# Patient Record
Sex: FEMALE | Race: WHITE | NOT HISPANIC OR LATINO
[De-identification: names, ages, dates, MRNs, and addresses within clinical notes are randomized per-mention and may not be internally consistent; named-entity substitution may affect disease eponyms.]

---

## 2017-02-06 ENCOUNTER — APPOINTMENT (OUTPATIENT)
Dept: MRI IMAGING | Facility: CLINIC | Age: 62
End: 2017-02-06

## 2017-02-06 ENCOUNTER — OUTPATIENT (OUTPATIENT)
Dept: OUTPATIENT SERVICES | Facility: HOSPITAL | Age: 62
LOS: 1 days | End: 2017-02-06
Payer: COMMERCIAL

## 2017-02-06 DIAGNOSIS — Z00.8 ENCOUNTER FOR OTHER GENERAL EXAMINATION: ICD-10-CM

## 2017-02-06 PROCEDURE — 73721 MRI JNT OF LWR EXTRE W/O DYE: CPT

## 2017-02-10 ENCOUNTER — OUTPATIENT (OUTPATIENT)
Dept: OUTPATIENT SERVICES | Facility: HOSPITAL | Age: 62
LOS: 1 days | Discharge: HOME | End: 2017-02-10
Payer: COMMERCIAL

## 2017-02-10 VITALS
HEIGHT: 64 IN | TEMPERATURE: 99 F | OXYGEN SATURATION: 100 % | DIASTOLIC BLOOD PRESSURE: 93 MMHG | SYSTOLIC BLOOD PRESSURE: 161 MMHG | WEIGHT: 168.43 LBS | RESPIRATION RATE: 18 BRPM | HEART RATE: 91 BPM

## 2017-02-10 DIAGNOSIS — M17.12 UNILATERAL PRIMARY OSTEOARTHRITIS, LEFT KNEE: ICD-10-CM

## 2017-02-10 DIAGNOSIS — S83.242A OTHER TEAR OF MEDIAL MENISCUS, CURRENT INJURY, LEFT KNEE, INITIAL ENCOUNTER: ICD-10-CM

## 2017-02-10 DIAGNOSIS — Z98.890 OTHER SPECIFIED POSTPROCEDURAL STATES: Chronic | ICD-10-CM

## 2017-02-10 LAB
ANION GAP SERPL CALC-SCNC: 11 MMOL/L — SIGNIFICANT CHANGE UP (ref 5–17)
APTT BLD: 32.3 SEC — SIGNIFICANT CHANGE UP (ref 27.5–37.4)
BASOPHILS # BLD AUTO: 0.1 K/UL — SIGNIFICANT CHANGE UP (ref 0–0.2)
BASOPHILS NFR BLD AUTO: 1 % — SIGNIFICANT CHANGE UP (ref 0–2)
BUN SERPL-MCNC: 18 MG/DL — SIGNIFICANT CHANGE UP (ref 7–23)
CALCIUM SERPL-MCNC: 8.6 MG/DL — SIGNIFICANT CHANGE UP (ref 8.5–10.1)
CHLORIDE SERPL-SCNC: 106 MMOL/L — SIGNIFICANT CHANGE UP (ref 96–108)
CO2 SERPL-SCNC: 27 MMOL/L — SIGNIFICANT CHANGE UP (ref 22–31)
CREAT SERPL-MCNC: 0.87 MG/DL — SIGNIFICANT CHANGE UP (ref 0.5–1.3)
EOSINOPHIL # BLD AUTO: 0.3 K/UL — SIGNIFICANT CHANGE UP (ref 0–0.5)
EOSINOPHIL NFR BLD AUTO: 5.3 % — SIGNIFICANT CHANGE UP (ref 0–6)
GLUCOSE SERPL-MCNC: 129 MG/DL — HIGH (ref 70–99)
HCT VFR BLD CALC: 41 % — SIGNIFICANT CHANGE UP (ref 34.5–45)
HGB BLD-MCNC: 13.7 G/DL — SIGNIFICANT CHANGE UP (ref 11.5–15.5)
INR BLD: 1.27 RATIO — HIGH (ref 0.88–1.16)
LYMPHOCYTES # BLD AUTO: 2.3 K/UL — SIGNIFICANT CHANGE UP (ref 1–3.3)
LYMPHOCYTES # BLD AUTO: 37.4 % — SIGNIFICANT CHANGE UP (ref 13–44)
MCHC RBC-ENTMCNC: 31.7 PG — SIGNIFICANT CHANGE UP (ref 27–34)
MCHC RBC-ENTMCNC: 33.4 GM/DL — SIGNIFICANT CHANGE UP (ref 32–36)
MCV RBC AUTO: 95 FL — SIGNIFICANT CHANGE UP (ref 80–100)
MONOCYTES # BLD AUTO: 0.4 K/UL — SIGNIFICANT CHANGE UP (ref 0–0.9)
MONOCYTES NFR BLD AUTO: 7.4 % — SIGNIFICANT CHANGE UP (ref 2–14)
NEUTROPHILS # BLD AUTO: 2.9 K/UL — SIGNIFICANT CHANGE UP (ref 1.8–7.4)
NEUTROPHILS NFR BLD AUTO: 48.9 % — SIGNIFICANT CHANGE UP (ref 43–77)
PLATELET # BLD AUTO: 278 K/UL — SIGNIFICANT CHANGE UP (ref 150–400)
POTASSIUM SERPL-MCNC: 4.4 MMOL/L — SIGNIFICANT CHANGE UP (ref 3.5–5.3)
POTASSIUM SERPL-SCNC: 4.4 MMOL/L — SIGNIFICANT CHANGE UP (ref 3.5–5.3)
PROTHROM AB SERPL-ACNC: 14 SEC — HIGH (ref 10–13.1)
RBC # BLD: 4.31 M/UL — SIGNIFICANT CHANGE UP (ref 3.8–5.2)
RBC # FLD: 11.9 % — SIGNIFICANT CHANGE UP (ref 10.3–14.5)
SODIUM SERPL-SCNC: 144 MMOL/L — SIGNIFICANT CHANGE UP (ref 135–145)
WBC # BLD: 6 K/UL — SIGNIFICANT CHANGE UP (ref 3.8–10.5)
WBC # FLD AUTO: 6 K/UL — SIGNIFICANT CHANGE UP (ref 3.8–10.5)

## 2017-02-10 PROCEDURE — 93010 ELECTROCARDIOGRAM REPORT: CPT

## 2017-02-10 NOTE — H&P PST ADULT - PSH
delivery delivered   &   S/P LASIK surgery of both eyes  2013  Status post lateral meniscus repair  2012  Status post tendon repair  Left Foot as a teenager

## 2017-02-10 NOTE — H&P PST ADULT - ASSESSMENT
This is a 60 y/o female with a left torn meniscus who is scheduled for a repair with Dr. Alvarez.    Plan:    1. NPO post midnight of surgery  2. Instructed patient on the use of EZ sponges  3. Patient instructed to take Losartan on day of surgery with a sip of water  4. Patient aware that they need medical clearance prior to surgery

## 2017-02-10 NOTE — H&P PST ADULT - HISTORY OF PRESENT ILLNESS
This is a 60 y/o female with a PMH of HTN and HLD who reports she has left knee pain for the last 3 weeks. She denies any injury to her left knee. She had a MRI which shows a left torn meniscus and she is now scheduled for a left meniscus  repair with Dr. Alvarez.

## 2017-02-13 ENCOUNTER — RESULT REVIEW (OUTPATIENT)
Age: 62
End: 2017-02-13

## 2017-02-14 ENCOUNTER — OUTPATIENT (OUTPATIENT)
Dept: OUTPATIENT SERVICES | Facility: HOSPITAL | Age: 62
LOS: 1 days | Discharge: HOME | End: 2017-02-14
Payer: COMMERCIAL

## 2017-02-14 VITALS
HEART RATE: 99 BPM | TEMPERATURE: 98 F | SYSTOLIC BLOOD PRESSURE: 164 MMHG | RESPIRATION RATE: 16 BRPM | DIASTOLIC BLOOD PRESSURE: 78 MMHG | OXYGEN SATURATION: 987 %

## 2017-02-14 VITALS
DIASTOLIC BLOOD PRESSURE: 84 MMHG | OXYGEN SATURATION: 99 % | RESPIRATION RATE: 16 BRPM | WEIGHT: 168.43 LBS | HEIGHT: 64 IN | HEART RATE: 92 BPM | SYSTOLIC BLOOD PRESSURE: 145 MMHG | TEMPERATURE: 100 F

## 2017-02-14 DIAGNOSIS — Z98.890 OTHER SPECIFIED POSTPROCEDURAL STATES: Chronic | ICD-10-CM

## 2017-02-14 PROCEDURE — 88304 TISSUE EXAM BY PATHOLOGIST: CPT | Mod: 26

## 2017-02-14 RX ORDER — OXYCODONE HYDROCHLORIDE 5 MG/1
5 TABLET ORAL EVERY 4 HOURS
Qty: 0 | Refills: 0 | Status: DISCONTINUED | OUTPATIENT
Start: 2017-02-14 | End: 2017-02-14

## 2017-02-14 RX ORDER — FENTANYL CITRATE 50 UG/ML
50 INJECTION INTRAVENOUS
Qty: 0 | Refills: 0 | Status: DISCONTINUED | OUTPATIENT
Start: 2017-02-14 | End: 2017-02-14

## 2017-02-14 RX ORDER — SODIUM CHLORIDE 9 MG/ML
1000 INJECTION, SOLUTION INTRAVENOUS
Qty: 0 | Refills: 0 | Status: DISCONTINUED | OUTPATIENT
Start: 2017-02-14 | End: 2017-03-01

## 2017-02-14 RX ORDER — ONDANSETRON 8 MG/1
4 TABLET, FILM COATED ORAL
Qty: 0 | Refills: 0 | Status: DISCONTINUED | OUTPATIENT
Start: 2017-02-14 | End: 2017-03-01

## 2017-02-14 RX ADMIN — FENTANYL CITRATE 50 MICROGRAM(S): 50 INJECTION INTRAVENOUS at 14:40

## 2017-02-14 RX ADMIN — OXYCODONE HYDROCHLORIDE 5 MILLIGRAM(S): 5 TABLET ORAL at 14:40

## 2017-02-14 RX ADMIN — SODIUM CHLORIDE 75 MILLILITER(S): 9 INJECTION, SOLUTION INTRAVENOUS at 14:43

## 2017-02-14 RX ADMIN — FENTANYL CITRATE 50 MICROGRAM(S): 50 INJECTION INTRAVENOUS at 14:54

## 2017-02-14 NOTE — ASU DISCHARGE PLAN (ADULT/PEDIATRIC). - ACTIVITY LEVEL
weight bearing as tolerated/elevate extremity/no exercise/no sports/gym/no heavy lifting/weight bearing as tolerated Left lower extremity with assistive devices/no tub baths/quiet play

## 2017-02-14 NOTE — BRIEF OPERATIVE NOTE - PROCEDURE
Knee arthroscopy, left  02/14/2017  Left Knee Arthroscopy, Partial Medial Menisectomy, Synovectomy, Chondroplasty  Active  [id number]

## 2017-02-14 NOTE — ASU DISCHARGE PLAN (ADULT/PEDIATRIC). - NURSING INSTRUCTIONS
apply ice to affected area for 24 to 48 hours, 20 minutes on then 20 minutes off. Do not apply directly to skin, place barrier between ice and skin.   For any problems or concerns,contact your doctor. [facility] patients should call the [facility]. If you cannot reach the doctor or clinic, call [facility] Emergency Department at [phone] or go to your local Emergency Department.  A responsible adult should be with you for the rest of the day and night for your safety and to help you if you needed. Resume your medications as listed on the attached Medication Record.

## 2017-02-14 NOTE — ASU DISCHARGE PLAN (ADULT/PEDIATRIC). - NOTIFY
Numbness, tingling/Swelling that continues/Pain not relieved by Medications/Numbness, color, or temperature change to extremity/Bleeding that does not stop/Fever greater than 101

## 2017-02-14 NOTE — ASU DISCHARGE PLAN (ADULT/PEDIATRIC). - MEDICATION SUMMARY - MEDICATIONS TO TAKE
I will START or STAY ON the medications listed below when I get home from the hospital:    Aspir 81 oral delayed release tablet  -- 1 tab(s) by mouth once a day  -- Indication: For per PMD    Percocet 5/325 oral tablet  -- 1 tab(s) by mouth every 4 hours, As Needed for severe pain  -- Indication: For Severe pain    losartan 100 mg oral tablet  -- 1 tab(s) by mouth once a day  -- Indication: For per PMD    Lipitor 40 mg oral tablet  -- 1 tab(s) by mouth once a day  -- Indication: For per PMD    niacin 50 mg oral tablet  -- 1 tab(s) by mouth once a day  -- Indication: For per PMD    Probiotic Formula (Bacillus Coagulans) oral capsule  -- 1 cap(s) by mouth once a day  -- Indication: For per PMD    Vitamin B Compound Strong  -- 1 cap(s) by mouth once a day  -- Indication: For per PMD    Indigo-C 1000 mg oral tablet  -- 1 tab(s) by mouth once a day  -- Indication: For per PMD    Vitamin D3 1000 intl units oral capsule  -- 4 cap(s) by mouth   -- This equals 4,000 IU a day  -- Indication: For per PMD

## 2017-02-14 NOTE — ASU DISCHARGE PLAN (ADULT/PEDIATRIC). - INSTRUCTIONS
Resume normal diet. Avoid alcohol when taking pain medication FU in 10-14 days. Please call for appt.

## 2017-02-16 DIAGNOSIS — M23.322 OTHER MENISCUS DERANGEMENTS, POSTERIOR HORN OF MEDIAL MENISCUS, LEFT KNEE: ICD-10-CM

## 2017-02-16 DIAGNOSIS — I10 ESSENTIAL (PRIMARY) HYPERTENSION: ICD-10-CM

## 2017-02-16 DIAGNOSIS — K57.30 DIVERTICULOSIS OF LARGE INTESTINE WITHOUT PERFORATION OR ABSCESS WITHOUT BLEEDING: ICD-10-CM

## 2017-02-16 DIAGNOSIS — E78.5 HYPERLIPIDEMIA, UNSPECIFIED: ICD-10-CM

## 2017-02-16 DIAGNOSIS — M17.12 UNILATERAL PRIMARY OSTEOARTHRITIS, LEFT KNEE: ICD-10-CM

## 2017-02-16 DIAGNOSIS — M65.862 OTHER SYNOVITIS AND TENOSYNOVITIS, LEFT LOWER LEG: ICD-10-CM

## 2017-02-16 DIAGNOSIS — Z87.442 PERSONAL HISTORY OF URINARY CALCULI: ICD-10-CM

## 2017-02-16 LAB — SURGICAL PATHOLOGY FINAL REPORT - CH: SIGNIFICANT CHANGE UP

## 2020-06-22 ENCOUNTER — EMERGENCY (EMERGENCY)
Facility: HOSPITAL | Age: 65
LOS: 0 days | Discharge: LEFT BEFORE DISCHARGE | End: 2020-06-22
Attending: EMERGENCY MEDICINE
Payer: COMMERCIAL

## 2020-06-22 VITALS
OXYGEN SATURATION: 97 % | HEIGHT: 65 IN | HEART RATE: 76 BPM | RESPIRATION RATE: 17 BRPM | WEIGHT: 164.91 LBS | SYSTOLIC BLOOD PRESSURE: 135 MMHG | DIASTOLIC BLOOD PRESSURE: 61 MMHG | TEMPERATURE: 98 F

## 2020-06-22 DIAGNOSIS — I10 ESSENTIAL (PRIMARY) HYPERTENSION: ICD-10-CM

## 2020-06-22 DIAGNOSIS — E78.5 HYPERLIPIDEMIA, UNSPECIFIED: ICD-10-CM

## 2020-06-22 DIAGNOSIS — Z79.82 LONG TERM (CURRENT) USE OF ASPIRIN: ICD-10-CM

## 2020-06-22 DIAGNOSIS — F32.9 MAJOR DEPRESSIVE DISORDER, SINGLE EPISODE, UNSPECIFIED: ICD-10-CM

## 2020-06-22 DIAGNOSIS — Z98.890 OTHER SPECIFIED POSTPROCEDURAL STATES: Chronic | ICD-10-CM

## 2020-06-22 DIAGNOSIS — I49.8 OTHER SPECIFIED CARDIAC ARRHYTHMIAS: ICD-10-CM

## 2020-06-22 DIAGNOSIS — Z79.899 OTHER LONG TERM (CURRENT) DRUG THERAPY: ICD-10-CM

## 2020-06-22 LAB
ALBUMIN SERPL ELPH-MCNC: 4.6 G/DL — SIGNIFICANT CHANGE UP (ref 3.3–5)
ALP SERPL-CCNC: 53 U/L — SIGNIFICANT CHANGE UP (ref 40–120)
ALT FLD-CCNC: 35 U/L — SIGNIFICANT CHANGE UP (ref 12–78)
ANION GAP SERPL CALC-SCNC: 5 MMOL/L — SIGNIFICANT CHANGE UP (ref 5–17)
APAP SERPL-MCNC: < 2 UG/ML (ref 10–30)
APPEARANCE UR: CLEAR — SIGNIFICANT CHANGE UP
AST SERPL-CCNC: 9 U/L — LOW (ref 15–37)
BASOPHILS # BLD AUTO: 0.03 K/UL — SIGNIFICANT CHANGE UP (ref 0–0.2)
BASOPHILS NFR BLD AUTO: 0.4 % — SIGNIFICANT CHANGE UP (ref 0–2)
BILIRUB SERPL-MCNC: 0.5 MG/DL — SIGNIFICANT CHANGE UP (ref 0.2–1.2)
BILIRUB UR-MCNC: NEGATIVE — SIGNIFICANT CHANGE UP
BUN SERPL-MCNC: 11 MG/DL — SIGNIFICANT CHANGE UP (ref 7–23)
CALCIUM SERPL-MCNC: 9.7 MG/DL — SIGNIFICANT CHANGE UP (ref 8.5–10.1)
CHLORIDE SERPL-SCNC: 105 MMOL/L — SIGNIFICANT CHANGE UP (ref 96–108)
CO2 SERPL-SCNC: 28 MMOL/L — SIGNIFICANT CHANGE UP (ref 22–31)
COLOR SPEC: YELLOW — SIGNIFICANT CHANGE UP
CREAT SERPL-MCNC: 2.13 MG/DL — HIGH (ref 0.5–1.3)
DIFF PNL FLD: NEGATIVE — SIGNIFICANT CHANGE UP
EOSINOPHIL # BLD AUTO: 0.19 K/UL — SIGNIFICANT CHANGE UP (ref 0–0.5)
EOSINOPHIL NFR BLD AUTO: 2.7 % — SIGNIFICANT CHANGE UP (ref 0–6)
ETHANOL SERPL-MCNC: <10 MG/DL — SIGNIFICANT CHANGE UP (ref 0–10)
GLUCOSE SERPL-MCNC: 101 MG/DL — HIGH (ref 70–99)
GLUCOSE UR QL: NEGATIVE MG/DL — SIGNIFICANT CHANGE UP
HCT VFR BLD CALC: 44 % — SIGNIFICANT CHANGE UP (ref 34.5–45)
HGB BLD-MCNC: 15.1 G/DL — SIGNIFICANT CHANGE UP (ref 11.5–15.5)
IMM GRANULOCYTES NFR BLD AUTO: 0.1 % — SIGNIFICANT CHANGE UP (ref 0–1.5)
KETONES UR-MCNC: ABNORMAL
LEUKOCYTE ESTERASE UR-ACNC: ABNORMAL
LYMPHOCYTES # BLD AUTO: 3.1 K/UL — SIGNIFICANT CHANGE UP (ref 1–3.3)
LYMPHOCYTES # BLD AUTO: 43.9 % — SIGNIFICANT CHANGE UP (ref 13–44)
MCHC RBC-ENTMCNC: 31.3 PG — SIGNIFICANT CHANGE UP (ref 27–34)
MCHC RBC-ENTMCNC: 34.3 GM/DL — SIGNIFICANT CHANGE UP (ref 32–36)
MCV RBC AUTO: 91.1 FL — SIGNIFICANT CHANGE UP (ref 80–100)
MONOCYTES # BLD AUTO: 0.42 K/UL — SIGNIFICANT CHANGE UP (ref 0–0.9)
MONOCYTES NFR BLD AUTO: 5.9 % — SIGNIFICANT CHANGE UP (ref 2–14)
NEUTROPHILS # BLD AUTO: 3.31 K/UL — SIGNIFICANT CHANGE UP (ref 1.8–7.4)
NEUTROPHILS NFR BLD AUTO: 47 % — SIGNIFICANT CHANGE UP (ref 43–77)
NITRITE UR-MCNC: NEGATIVE — SIGNIFICANT CHANGE UP
PCP SPEC-MCNC: SIGNIFICANT CHANGE UP
PH UR: 7 — SIGNIFICANT CHANGE UP (ref 5–8)
PLATELET # BLD AUTO: 232 K/UL — SIGNIFICANT CHANGE UP (ref 150–400)
POTASSIUM SERPL-MCNC: 3.6 MMOL/L — SIGNIFICANT CHANGE UP (ref 3.5–5.3)
POTASSIUM SERPL-SCNC: 3.6 MMOL/L — SIGNIFICANT CHANGE UP (ref 3.5–5.3)
PROT SERPL-MCNC: 8.3 GM/DL — SIGNIFICANT CHANGE UP (ref 6–8.3)
PROT UR-MCNC: NEGATIVE MG/DL — SIGNIFICANT CHANGE UP
RBC # BLD: 4.83 M/UL — SIGNIFICANT CHANGE UP (ref 3.8–5.2)
RBC # FLD: 12.5 % — SIGNIFICANT CHANGE UP (ref 10.3–14.5)
SALICYLATES SERPL-MCNC: <1.7 MG/DL — LOW (ref 2.8–20)
SODIUM SERPL-SCNC: 138 MMOL/L — SIGNIFICANT CHANGE UP (ref 135–145)
SP GR SPEC: 1 — LOW (ref 1.01–1.02)
UROBILINOGEN FLD QL: NEGATIVE MG/DL — SIGNIFICANT CHANGE UP
WBC # BLD: 7.06 K/UL — SIGNIFICANT CHANGE UP (ref 3.8–10.5)
WBC # FLD AUTO: 7.06 K/UL — SIGNIFICANT CHANGE UP (ref 3.8–10.5)

## 2020-06-22 PROCEDURE — 99284 EMERGENCY DEPT VISIT MOD MDM: CPT

## 2020-06-22 PROCEDURE — 99283 EMERGENCY DEPT VISIT LOW MDM: CPT

## 2020-06-22 PROCEDURE — 93005 ELECTROCARDIOGRAM TRACING: CPT

## 2020-06-22 PROCEDURE — 36415 COLL VENOUS BLD VENIPUNCTURE: CPT

## 2020-06-22 PROCEDURE — 93010 ELECTROCARDIOGRAM REPORT: CPT

## 2020-06-22 PROCEDURE — 81001 URINALYSIS AUTO W/SCOPE: CPT

## 2020-06-22 PROCEDURE — 80307 DRUG TEST PRSMV CHEM ANLYZR: CPT

## 2020-06-22 PROCEDURE — 80053 COMPREHEN METABOLIC PANEL: CPT

## 2020-06-22 PROCEDURE — 85025 COMPLETE CBC W/AUTO DIFF WBC: CPT

## 2020-06-22 NOTE — ED ADULT NURSE NOTE - OBJECTIVE STATEMENT
pt BIBEMS, A&Ox3.  called ambulance after pt drank rubbing alcohol and took 2 xanax. pt states worsening depression due to not being able to see family during COVID. denies HI/SI.

## 2020-06-22 NOTE — ED ADULT TRIAGE NOTE - CHIEF COMPLAINT QUOTE
Pt BIBA after  called because the patient drank rubbing alcohol and took a xanax.  pt endorses depression but was not attempting to hurt herself.  Pt denies SI/HI but endorses needing to speak to someone.  pt calm/cooperative upon arrival to ed.

## 2020-06-22 NOTE — ED ADULT NURSE REASSESSMENT NOTE - NS ED NURSE REASSESS COMMENT FT1
Report received from JUSTA Parikh. pt is A&O x3, resting in bed. denies SI/HI. waiting for telepsych evaluation. provided with water. safety maintained, will continue to monitor.

## 2020-06-22 NOTE — ED ADULT NURSE REASSESSMENT NOTE - NS ED NURSE REASSESS COMMENT FT1
Patients son and  updated in person in triage. Verified all information that the patient gave RN and MD however did drink the entire pint of rubbing alcohol per  (hasn't had any alcohol in a couple of months). Told the family it will be at least a couple of hours until we have an update because we just sent labs. Efrain is  [phone] and Luis A is son [phone]. Gave Efrain the phone number to ED for updates just in case nurse doesn't call with one in a couple of hours. Family very pleasant and accepts plan.

## 2020-06-22 NOTE — ED PROVIDER NOTE - PROGRESS NOTE DETAILS
pt refusing to stay for psych eval. son could not convince her to stay either. son agreeable to pick her up. states that his father and he were mainly worried about her being ok medically.  pt still denies SI/HI . states eh will followup with her therapist. s/o LUL. MD CHAYA

## 2020-06-22 NOTE — ED PROVIDER NOTE - PATIENT PORTAL LINK FT
You can access the FollowMyHealth Patient Portal offered by [facility] by registering at the following website: [email or web address]. By joining [facility]’s FollowMyHealth portal, you will also be able to view your health information using other applications (apps) compatible with our system.

## 2020-06-22 NOTE — ED PROVIDER NOTE - OBJECTIVE STATEMENT
63 y/o female with a PMHx of HTN, HLD, presents to the ED BIBEMS c/o depression. Pt has been very sad during the pandemic due to she has been unable to see family or her grandchildren as she usual could. Has a therapist who she sees once a week via tele medicine, last visit was last Wednesday. Pt states she has been depressed. Not on antidepressants. Taking Xanax prn 2mg. Today took 2 of her Xanax pills and 4-5 ounces of rubbing alcohol, told , and  called 911. Denies SI/HI. Denies taking other meds. Hx of alcohol abuse, but states it has been a month since last drink. No smoking.

## 2020-06-22 NOTE — ED BEHAVIORAL HEALTH NOTE - BEHAVIORAL HEALTH NOTE
Consult canceled by Dr. Chaves. The patient left AMA and was not evaluated by Telepsychiatry- no contact was made by any member of Telepsychiatry team with patient or collateral sources of information. Dr. Chaves informed to consult Telepsychiatry as needed.

## 2020-06-22 NOTE — ED ADULT NURSE REASSESSMENT NOTE - NS ED NURSE REASSESS COMMENT FT1
pt expressed that she does not want to wait for telepsych evaluation and would like to go home. RN called telepsych for update on what time the pt would be evaluated. Telepsych states pt is next on list to be evaluated. pt states she still does not want to wait for evaluation and would like to leave AMA. MD Chaves aware. Pt wishes to leave at this time and understands that they are leaving against medical advice. pt is awake and alert and demonstrates ability to make medical decisions. The patient understands that they may return at any time if desired. pt picked up from ER by son.

## 2020-06-23 PROBLEM — I10 ESSENTIAL (PRIMARY) HYPERTENSION: Chronic | Status: ACTIVE | Noted: 2017-02-10

## 2020-06-23 PROBLEM — E78.5 HYPERLIPIDEMIA, UNSPECIFIED: Chronic | Status: ACTIVE | Noted: 2017-02-10

## 2021-04-16 ENCOUNTER — APPOINTMENT (OUTPATIENT)
Dept: FAMILY MEDICINE | Facility: CLINIC | Age: 66
End: 2021-04-16
Payer: MEDICARE

## 2021-04-16 ENCOUNTER — NON-APPOINTMENT (OUTPATIENT)
Age: 66
End: 2021-04-16

## 2021-04-16 VITALS
TEMPERATURE: 95.3 F | WEIGHT: 150 LBS | HEART RATE: 56 BPM | BODY MASS INDEX: 27.6 KG/M2 | SYSTOLIC BLOOD PRESSURE: 128 MMHG | DIASTOLIC BLOOD PRESSURE: 80 MMHG | OXYGEN SATURATION: 98 % | HEIGHT: 62 IN

## 2021-04-16 DIAGNOSIS — Z78.9 OTHER SPECIFIED HEALTH STATUS: ICD-10-CM

## 2021-04-16 DIAGNOSIS — Z87.59 PERSONAL HISTORY OF OTHER COMPLICATIONS OF PREGNANCY, CHILDBIRTH AND THE PUERPERIUM: ICD-10-CM

## 2021-04-16 DIAGNOSIS — Z87.898 PERSONAL HISTORY OF OTHER SPECIFIED CONDITIONS: ICD-10-CM

## 2021-04-16 DIAGNOSIS — Z86.19 PERSONAL HISTORY OF OTHER INFECTIOUS AND PARASITIC DISEASES: ICD-10-CM

## 2021-04-16 DIAGNOSIS — F10.11 ALCOHOL ABUSE, IN REMISSION: ICD-10-CM

## 2021-04-16 DIAGNOSIS — N39.0 URINARY TRACT INFECTION, SITE NOT SPECIFIED: ICD-10-CM

## 2021-04-16 DIAGNOSIS — Z87.442 PERSONAL HISTORY OF URINARY CALCULI: ICD-10-CM

## 2021-04-16 DIAGNOSIS — F19.980: ICD-10-CM

## 2021-04-16 DIAGNOSIS — Z80.7 FAMILY HISTORY OF OTHER MALIGNANT NEOPLASMS OF LYMPHOID, HEMATOPOIETIC AND RELATED TISSUES: ICD-10-CM

## 2021-04-16 DIAGNOSIS — F10.21 ALCOHOL DEPENDENCE, IN REMISSION: ICD-10-CM

## 2021-04-16 DIAGNOSIS — Z92.89 PERSONAL HISTORY OF OTHER MEDICAL TREATMENT: ICD-10-CM

## 2021-04-16 DIAGNOSIS — Z82.49 FAMILY HISTORY OF ISCHEMIC HEART DISEASE AND OTHER DISEASES OF THE CIRCULATORY SYSTEM: ICD-10-CM

## 2021-04-16 DIAGNOSIS — T07.XXXS: ICD-10-CM

## 2021-04-16 DIAGNOSIS — Z86.39 PERSONAL HISTORY OF OTHER ENDOCRINE, NUTRITIONAL AND METABOLIC DISEASE: ICD-10-CM

## 2021-04-16 DIAGNOSIS — K57.90 DIVERTICULOSIS OF INTESTINE, PART UNSPECIFIED, W/OUT PERFORATION OR ABSCESS W/OUT BLEEDING: ICD-10-CM

## 2021-04-16 DIAGNOSIS — R73.01 IMPAIRED FASTING GLUCOSE: ICD-10-CM

## 2021-04-16 LAB
BILIRUB UR QL STRIP: NORMAL
COLLECTION METHOD: NORMAL
GLUCOSE UR-MCNC: NORMAL
HCG UR QL: 0.2 EU/DL
HGB UR QL STRIP.AUTO: NORMAL
KETONES UR-MCNC: NORMAL
LEUKOCYTE ESTERASE UR QL STRIP: NORMAL
NITRITE UR QL STRIP: NORMAL
PH UR STRIP: 7
PROT UR STRIP-MCNC: NORMAL
SP GR UR STRIP: 1.02

## 2021-04-16 PROCEDURE — 81003 URINALYSIS AUTO W/O SCOPE: CPT | Mod: QW

## 2021-04-16 PROCEDURE — 99072 ADDL SUPL MATRL&STAF TM PHE: CPT

## 2021-04-16 PROCEDURE — 99213 OFFICE O/P EST LOW 20 MIN: CPT | Mod: 25

## 2021-04-16 RX ORDER — ASPIRIN 81 MG/1
81 TABLET, CHEWABLE ORAL DAILY
Refills: 0 | Status: ACTIVE | COMMUNITY

## 2021-04-16 RX ORDER — MULTIVIT-MIN/FOLIC/VIT K/LYCOP 400-300MCG
25 MCG TABLET ORAL DAILY
Refills: 0 | Status: ACTIVE | COMMUNITY

## 2021-04-16 RX ORDER — B-COMPLEX WITH VITAMIN C
TABLET ORAL
Refills: 0 | Status: ACTIVE | COMMUNITY

## 2021-04-16 NOTE — HISTORY OF PRESENT ILLNESS
[FreeTextEntry8] : Patient is here for complaint of UTI symptoms x 4 days with frequency , hesitancy , urgency. She has not tried any conservative measures except increasing her hydration .

## 2021-04-16 NOTE — PHYSICAL EXAM
[No Acute Distress] : no acute distress [Well Nourished] : well nourished [Well Developed] : well developed [Well-Appearing] : well-appearing [No JVD] : no jugular venous distention [No Respiratory Distress] : no respiratory distress  [No Accessory Muscle Use] : no accessory muscle use [Clear to Auscultation] : lungs were clear to auscultation bilaterally [Normal Rate] : normal rate  [Regular Rhythm] : with a regular rhythm [Normal S1, S2] : normal S1 and S2 [No Murmur] : no murmur heard [No Carotid Bruits] : no carotid bruits [No Abdominal Bruit] : a ~M bruit was not heard ~T in the abdomen [No Varicosities] : no varicosities [Pedal Pulses Present] : the pedal pulses are present [No Edema] : there was no peripheral edema [No Palpable Aorta] : no palpable aorta [No Extremity Clubbing/Cyanosis] : no extremity clubbing/cyanosis [Soft] : abdomen soft [Non Tender] : non-tender [Non-distended] : non-distended [No Masses] : no abdominal mass palpated [No HSM] : no HSM [Normal Bowel Sounds] : normal bowel sounds [Normal Posterior Cervical Nodes] : no posterior cervical lymphadenopathy [Normal Anterior Cervical Nodes] : no anterior cervical lymphadenopathy [No Joint Swelling] : no joint swelling [Grossly Normal Strength/Tone] : grossly normal strength/tone [No Rash] : no rash [Coordination Grossly Intact] : coordination grossly intact [No Focal Deficits] : no focal deficits [Normal Gait] : normal gait [Deep Tendon Reflexes (DTR)] : deep tendon reflexes were 2+ and symmetric [Normal Affect] : the affect was normal [Normal Insight/Judgement] : insight and judgment were intact

## 2021-04-17 ENCOUNTER — RX CHANGE (OUTPATIENT)
Age: 66
End: 2021-04-17

## 2021-04-17 RX ORDER — NITROFURANTOIN MACROCRYSTALS 100 MG/1
100 CAPSULE ORAL TWICE DAILY
Qty: 10 | Refills: 0 | Status: DISCONTINUED | COMMUNITY
Start: 2021-04-16 | End: 2021-04-17

## 2021-05-02 ENCOUNTER — TRANSCRIPTION ENCOUNTER (OUTPATIENT)
Age: 66
End: 2021-05-02

## 2021-05-03 ENCOUNTER — TRANSCRIPTION ENCOUNTER (OUTPATIENT)
Age: 66
End: 2021-05-03

## 2021-05-30 ENCOUNTER — TRANSCRIPTION ENCOUNTER (OUTPATIENT)
Age: 66
End: 2021-05-30

## 2021-06-03 ENCOUNTER — APPOINTMENT (OUTPATIENT)
Dept: FAMILY MEDICINE | Facility: CLINIC | Age: 66
End: 2021-06-03
Payer: MEDICARE

## 2021-06-03 VITALS
HEART RATE: 70 BPM | BODY MASS INDEX: 27.6 KG/M2 | HEIGHT: 62 IN | WEIGHT: 150 LBS | OXYGEN SATURATION: 98 % | SYSTOLIC BLOOD PRESSURE: 130 MMHG | DIASTOLIC BLOOD PRESSURE: 70 MMHG | TEMPERATURE: 96 F

## 2021-06-03 PROCEDURE — 99072 ADDL SUPL MATRL&STAF TM PHE: CPT

## 2021-06-03 PROCEDURE — 99213 OFFICE O/P EST LOW 20 MIN: CPT

## 2021-06-03 NOTE — PLAN
[FreeTextEntry1] :  I have prescribed Mupirocin for topical tx. I have advised patient can send tick for testing through a website that offers free testing\par If she is interested in lyme titer , she may return in 4-6 weeks for labs.

## 2021-06-03 NOTE — PHYSICAL EXAM
[No Acute Distress] : no acute distress [Well Nourished] : well nourished [Well Developed] : well developed [Well-Appearing] : well-appearing [Normal Sclera/Conjunctiva] : normal sclera/conjunctiva [PERRL] : pupils equal round and reactive to light [EOMI] : extraocular movements intact [Normal Oropharynx] : the oropharynx was normal [No JVD] : no jugular venous distention [No Lymphadenopathy] : no lymphadenopathy [Supple] : supple [Thyroid Normal, No Nodules] : the thyroid was normal and there were no nodules present [No Respiratory Distress] : no respiratory distress  [No Accessory Muscle Use] : no accessory muscle use [Clear to Auscultation] : lungs were clear to auscultation bilaterally [Normal Rate] : normal rate  [Regular Rhythm] : with a regular rhythm [Normal S1, S2] : normal S1 and S2 [No Murmur] : no murmur heard [No Carotid Bruits] : no carotid bruits [No Abdominal Bruit] : a ~M bruit was not heard ~T in the abdomen [No Varicosities] : no varicosities [Pedal Pulses Present] : the pedal pulses are present [No Edema] : there was no peripheral edema [No Palpable Aorta] : no palpable aorta [No Extremity Clubbing/Cyanosis] : no extremity clubbing/cyanosis [Soft] : abdomen soft [Non Tender] : non-tender [Non-distended] : non-distended [No Masses] : no abdominal mass palpated [No HSM] : no HSM [Normal Bowel Sounds] : normal bowel sounds [Normal Posterior Cervical Nodes] : no posterior cervical lymphadenopathy [Normal Anterior Cervical Nodes] : no anterior cervical lymphadenopathy [No CVA Tenderness] : no CVA  tenderness [No Spinal Tenderness] : no spinal tenderness [No Joint Swelling] : no joint swelling [Grossly Normal Strength/Tone] : grossly normal strength/tone [No Rash] : no rash [Coordination Grossly Intact] : coordination grossly intact [No Focal Deficits] : no focal deficits [Normal Gait] : normal gait [Deep Tendon Reflexes (DTR)] : deep tendon reflexes were 2+ and symmetric [Normal Affect] : the affect was normal [Normal Insight/Judgement] : insight and judgment were intact [de-identified] : left outer ear is erythematous

## 2021-06-03 NOTE — HISTORY OF PRESENT ILLNESS
[FreeTextEntry8] : Patient is here with complaint of tick bite in her left ear approx. 5 days ago ,she was seen at urgent care and was prescribed Doxycycline x 10 days . She does complain that tick bite area is red and painful still \par She is concerned re having tick tested , as it is a lone star tick. She is also requesting Lyme testing ,since she is outdoors most days

## 2021-06-08 ENCOUNTER — RX RENEWAL (OUTPATIENT)
Age: 66
End: 2021-06-08

## 2021-06-08 ENCOUNTER — APPOINTMENT (OUTPATIENT)
Dept: FAMILY MEDICINE | Facility: CLINIC | Age: 66
End: 2021-06-08
Payer: MEDICARE

## 2021-06-08 VITALS
HEIGHT: 62 IN | SYSTOLIC BLOOD PRESSURE: 120 MMHG | DIASTOLIC BLOOD PRESSURE: 78 MMHG | WEIGHT: 150 LBS | TEMPERATURE: 96.4 F | BODY MASS INDEX: 27.6 KG/M2 | OXYGEN SATURATION: 96 % | HEART RATE: 71 BPM

## 2021-06-08 PROCEDURE — 99213 OFFICE O/P EST LOW 20 MIN: CPT

## 2021-06-08 PROCEDURE — 99072 ADDL SUPL MATRL&STAF TM PHE: CPT

## 2021-06-08 NOTE — PHYSICAL EXAM
[No Acute Distress] : no acute distress [Well Nourished] : well nourished [Well Developed] : well developed [Well-Appearing] : well-appearing [Normal Sclera/Conjunctiva] : normal sclera/conjunctiva [PERRL] : pupils equal round and reactive to light [EOMI] : extraocular movements intact [Normal Oropharynx] : the oropharynx was normal [No JVD] : no jugular venous distention [Supple] : supple [No Lymphadenopathy] : no lymphadenopathy [Thyroid Normal, No Nodules] : the thyroid was normal and there were no nodules present [No Respiratory Distress] : no respiratory distress  [No Accessory Muscle Use] : no accessory muscle use [Clear to Auscultation] : lungs were clear to auscultation bilaterally [Normal Rate] : normal rate  [Regular Rhythm] : with a regular rhythm [Normal S1, S2] : normal S1 and S2 [No Murmur] : no murmur heard [No Carotid Bruits] : no carotid bruits [No Abdominal Bruit] : a ~M bruit was not heard ~T in the abdomen [No Varicosities] : no varicosities [Pedal Pulses Present] : the pedal pulses are present [No Edema] : there was no peripheral edema [No Palpable Aorta] : no palpable aorta [No Extremity Clubbing/Cyanosis] : no extremity clubbing/cyanosis [Soft] : abdomen soft [Non Tender] : non-tender [Non-distended] : non-distended [No Masses] : no abdominal mass palpated [No HSM] : no HSM [Normal Bowel Sounds] : normal bowel sounds [Normal Posterior Cervical Nodes] : no posterior cervical lymphadenopathy [Normal Anterior Cervical Nodes] : no anterior cervical lymphadenopathy [No CVA Tenderness] : no CVA  tenderness [No Spinal Tenderness] : no spinal tenderness [No Joint Swelling] : no joint swelling [Grossly Normal Strength/Tone] : grossly normal strength/tone [No Rash] : no rash [Coordination Grossly Intact] : coordination grossly intact [No Focal Deficits] : no focal deficits [Normal Gait] : normal gait [Deep Tendon Reflexes (DTR)] : deep tendon reflexes were 2+ and symmetric [Normal Affect] : the affect was normal [Normal Insight/Judgement] : insight and judgment were intact [de-identified] : left outer ear is erythematous and edematous

## 2021-06-08 NOTE — PLAN
[FreeTextEntry1] : PBB consulted , advised that pt has perichondritis , and recommended Abx tx course is Cipro x 10-14 days. Pt advised to stop Abx if resolved after 7-10 days , otherwise notify us and we would extend tx course .\par If she is interested in lyme titer , she may return in 4-6 weeks for labs.

## 2021-06-08 NOTE — HISTORY OF PRESENT ILLNESS
[FreeTextEntry8] : Patient is here with complaint of tick bite in her left ear approx. 10 days ago ,she was seen at urgent care and was prescribed Doxycycline x 10 days . She completed Doxycycline ,however she  does complain that tick bite area is red , swollen and painful still \par

## 2021-07-09 ENCOUNTER — NON-APPOINTMENT (OUTPATIENT)
Age: 66
End: 2021-07-09

## 2021-07-12 ENCOUNTER — LABORATORY RESULT (OUTPATIENT)
Age: 66
End: 2021-07-12

## 2021-07-12 ENCOUNTER — APPOINTMENT (OUTPATIENT)
Dept: FAMILY MEDICINE | Facility: CLINIC | Age: 66
End: 2021-07-12
Payer: MEDICARE

## 2021-07-12 PROCEDURE — 36415 COLL VENOUS BLD VENIPUNCTURE: CPT

## 2021-07-13 ENCOUNTER — TRANSCRIPTION ENCOUNTER (OUTPATIENT)
Age: 66
End: 2021-07-13

## 2021-07-13 LAB — B BURGDOR IGG+IGM SER QL IB: NORMAL

## 2021-07-19 ENCOUNTER — TRANSCRIPTION ENCOUNTER (OUTPATIENT)
Age: 66
End: 2021-07-19

## 2021-07-23 ENCOUNTER — TRANSCRIPTION ENCOUNTER (OUTPATIENT)
Age: 66
End: 2021-07-23

## 2021-08-17 ENCOUNTER — APPOINTMENT (OUTPATIENT)
Dept: FAMILY MEDICINE | Facility: CLINIC | Age: 66
End: 2021-08-17
Payer: MEDICARE

## 2021-08-17 VITALS
BODY MASS INDEX: 27.6 KG/M2 | HEIGHT: 62 IN | SYSTOLIC BLOOD PRESSURE: 118 MMHG | WEIGHT: 150 LBS | DIASTOLIC BLOOD PRESSURE: 70 MMHG | OXYGEN SATURATION: 98 % | HEART RATE: 66 BPM | TEMPERATURE: 98.1 F

## 2021-08-17 PROCEDURE — 36415 COLL VENOUS BLD VENIPUNCTURE: CPT

## 2021-08-17 PROCEDURE — 99214 OFFICE O/P EST MOD 30 MIN: CPT | Mod: 25

## 2021-08-17 RX ORDER — NITROFURANTOIN MACROCRYSTALS 100 MG/1
100 CAPSULE ORAL TWICE DAILY
Qty: 10 | Refills: 0 | Status: DISCONTINUED | COMMUNITY
Start: 2021-04-17 | End: 2021-08-17

## 2021-08-17 RX ORDER — CIPROFLOXACIN HYDROCHLORIDE 500 MG/1
500 TABLET, FILM COATED ORAL TWICE DAILY
Qty: 20 | Refills: 0 | Status: DISCONTINUED | COMMUNITY
Start: 2021-06-08 | End: 2021-08-17

## 2021-08-17 RX ORDER — MUPIROCIN 20 MG/G
2 OINTMENT TOPICAL 3 TIMES DAILY
Qty: 1 | Refills: 2 | Status: DISCONTINUED | COMMUNITY
Start: 2021-06-03 | End: 2021-08-17

## 2021-08-17 NOTE — PHYSICAL EXAM
[No Acute Distress] : no acute distress [Well Nourished] : well nourished [Well Developed] : well developed [Well-Appearing] : well-appearing [Normal Sclera/Conjunctiva] : normal sclera/conjunctiva [PERRL] : pupils equal round and reactive to light [EOMI] : extraocular movements intact [Normal Oropharynx] : the oropharynx was normal [Normal Outer Ear/Nose] : the outer ears and nose were normal in appearance [No Lymphadenopathy] : no lymphadenopathy [No JVD] : no jugular venous distention [Supple] : supple [Thyroid Normal, No Nodules] : the thyroid was normal and there were no nodules present [No Respiratory Distress] : no respiratory distress  [No Accessory Muscle Use] : no accessory muscle use [Clear to Auscultation] : lungs were clear to auscultation bilaterally [Normal Rate] : normal rate  [Regular Rhythm] : with a regular rhythm [Normal S1, S2] : normal S1 and S2 [No Murmur] : no murmur heard [No Carotid Bruits] : no carotid bruits [No Abdominal Bruit] : a ~M bruit was not heard ~T in the abdomen [No Varicosities] : no varicosities [Pedal Pulses Present] : the pedal pulses are present [No Edema] : there was no peripheral edema [No Palpable Aorta] : no palpable aorta [No Extremity Clubbing/Cyanosis] : no extremity clubbing/cyanosis [Soft] : abdomen soft [Non Tender] : non-tender [Non-distended] : non-distended [No Masses] : no abdominal mass palpated [No HSM] : no HSM [Normal Bowel Sounds] : normal bowel sounds [Normal Posterior Cervical Nodes] : no posterior cervical lymphadenopathy [Normal Anterior Cervical Nodes] : no anterior cervical lymphadenopathy [No CVA Tenderness] : no CVA  tenderness [No Spinal Tenderness] : no spinal tenderness [No Joint Swelling] : no joint swelling [Grossly Normal Strength/Tone] : grossly normal strength/tone [No Rash] : no rash [Coordination Grossly Intact] : coordination grossly intact [No Focal Deficits] : no focal deficits [Normal Gait] : normal gait [Deep Tendon Reflexes (DTR)] : deep tendon reflexes were 2+ and symmetric [Normal Affect] : the affect was normal [Normal Insight/Judgement] : insight and judgment were intact [de-identified] : leg cramps

## 2021-08-17 NOTE — HISTORY OF PRESENT ILLNESS
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 She is compliant with medications ,diet and exercise.\par She is fasting today .

## 2021-08-17 NOTE — PLAN
[FreeTextEntry1] : Fasting labs performed today . WIll advise to continue with diet and exercise and medication \par Discussed a trial of decreasing atorvastatin back to 40 mg to help improve her leg cramps and then recheck labs again in several months time \par Will recommend repeat followup in 6 months\par

## 2021-08-18 ENCOUNTER — TRANSCRIPTION ENCOUNTER (OUTPATIENT)
Age: 66
End: 2021-08-18

## 2021-08-18 LAB
ALBUMIN SERPL ELPH-MCNC: 4.8 G/DL
ALP BLD-CCNC: 43 U/L
ALT SERPL-CCNC: 21 U/L
ANION GAP SERPL CALC-SCNC: 11 MMOL/L
AST SERPL-CCNC: 14 U/L
BILIRUB SERPL-MCNC: 0.6 MG/DL
BUN SERPL-MCNC: 18 MG/DL
CALCIUM SERPL-MCNC: 9 MG/DL
CHLORIDE SERPL-SCNC: 104 MMOL/L
CHOLEST SERPL-MCNC: 149 MG/DL
CO2 SERPL-SCNC: 25 MMOL/L
CREAT SERPL-MCNC: 0.82 MG/DL
ESTIMATED AVERAGE GLUCOSE: 100 MG/DL
GLUCOSE SERPL-MCNC: 103 MG/DL
HBA1C MFR BLD HPLC: 5.1 %
HDLC SERPL-MCNC: 74 MG/DL
LDLC SERPL CALC-MCNC: 65 MG/DL
NONHDLC SERPL-MCNC: 74 MG/DL
POTASSIUM SERPL-SCNC: 5.2 MMOL/L
PROT SERPL-MCNC: 6.9 G/DL
SODIUM SERPL-SCNC: 139 MMOL/L
TRIGL SERPL-MCNC: 45 MG/DL

## 2021-10-05 ENCOUNTER — OUTPATIENT (OUTPATIENT)
Dept: OUTPATIENT SERVICES | Facility: HOSPITAL | Age: 66
LOS: 1 days | End: 2021-10-05

## 2021-10-05 DIAGNOSIS — Z98.890 OTHER SPECIFIED POSTPROCEDURAL STATES: Chronic | ICD-10-CM

## 2021-11-15 ENCOUNTER — TRANSCRIPTION ENCOUNTER (OUTPATIENT)
Age: 66
End: 2021-11-15

## 2021-11-17 ENCOUNTER — APPOINTMENT (OUTPATIENT)
Dept: FAMILY MEDICINE | Facility: CLINIC | Age: 66
End: 2021-11-17
Payer: MEDICARE

## 2021-11-17 VITALS
WEIGHT: 150 LBS | SYSTOLIC BLOOD PRESSURE: 116 MMHG | OXYGEN SATURATION: 99 % | HEIGHT: 62 IN | DIASTOLIC BLOOD PRESSURE: 70 MMHG | TEMPERATURE: 97.3 F | HEART RATE: 88 BPM | BODY MASS INDEX: 27.6 KG/M2

## 2021-11-17 DIAGNOSIS — J01.90 ACUTE SINUSITIS, UNSPECIFIED: ICD-10-CM

## 2021-11-17 PROCEDURE — 36415 COLL VENOUS BLD VENIPUNCTURE: CPT

## 2021-11-17 PROCEDURE — 99214 OFFICE O/P EST MOD 30 MIN: CPT | Mod: 25

## 2021-11-17 NOTE — ASSESSMENT
[FreeTextEntry1] : acute sinusitis/ PND\par start Augmentin/ fluticasone NS\par conservative mgmt\par counseling as discussed above\par BW cbc/ cmp, nasal swab covid/ flu - "labs drawn in office" \par encouraged to notify office if worsening/persistent symptoms or new onset complaints/concerns, in the event of any emergency or life threatening condition, go to the nearest emergency department and then notify office. \par patient verbalized understanding and agrees with plan of care. \par \par continued follow up with PCP for chronic concerns and preventative health management. \par patient verbalized understanding and agrees with plan of care.

## 2021-11-17 NOTE — COUNSELING
[Fall prevention counseling provided] : Fall prevention counseling provided [Behavioral health counseling provided] : Behavioral health counseling provided [Sleep ___ hours/day] : Sleep [unfilled] hours/day [Engage in a relaxing activity] : Engage in a relaxing activity [Plan in advance] : Plan in advance [None] : None [de-identified] : URI;\par take medications as prescribed/directed\par use of OTC probiotic recommended.\par warm salt water gargles as needed for symptoms relief.\par get plenty of rest and fluids.\par infection prevention education as discussed above; view counseling section.\par notify/return to office if worsening/persistent symptoms or new onset complaints/concerns, in the event of any emergency or life threatening condition, go to the nearest emergency department and then notify office. \par patient verbalized understanding and agrees with plan of care. \par \par infection prevention-\par Avoid close contact with those that are sick/if you are sick- limit contact with healthy individuals as much as possible. The CDC recommends staying home (except to get medical care/other necessities) 24 hours after being free of fever without use of fever-reducing medication. Cover your nose/mouth with a tissue when you cough/sneeze and throw the tissue in the garbage after use. Wash your hands with soap and water often or alcohol-based hand  if not available, especially after blowing nose/using tissue. \par Avoid touching eyes, nose, mouth to prevent spread of infection.\par clean and disinfect surfaces and objects contaminated with germs. \par \par For more information you can visit: https://www.cdc.gov  [Good understanding] : Patient has a good understanding of lifestyle changes and steps needed to achieve self management goal

## 2021-11-17 NOTE — REVIEW OF SYSTEMS
[Chills] : chills [Fatigue] : fatigue [Nasal Discharge] : nasal discharge [Sore Throat] : sore throat [Postnasal Drip] : postnasal drip [Cough] : cough [Diarrhea] : diarrhea [Negative] : Heme/Lymph [Fever] : no fever [Hot Flashes] : no hot flashes [Night Sweats] : no night sweats [Recent Change In Weight] : ~T no recent weight change [Earache] : no earache [Hearing Loss] : no hearing loss [Nosebleed] : no nosebleeds [Hoarseness] : no hoarseness [Shortness Of Breath] : no shortness of breath [Wheezing] : no wheezing [Dyspnea on Exertion] : no dyspnea on exertion [Joint Pain] : no joint pain [Muscle Pain] : no muscle pain [Skin Rash] : no skin rash [FreeTextEntry7] : reports watery stools since Sunday

## 2021-11-17 NOTE — HEALTH RISK ASSESSMENT
[No] : In the past 12 months have you used drugs other than those required for medical reasons? No [0] : 2) Feeling down, depressed, or hopeless: Not at all (0) [PHQ-2 Negative - No further assessment needed] : PHQ-2 Negative - No further assessment needed [] : No [[id number]] : 0

## 2021-11-17 NOTE — HISTORY OF PRESENT ILLNESS
[Congestion] : congestion [Cold Symptoms] : cold symptoms [Severe] : severe [___ Days ago] : [unfilled] days ago [Constant] : constant [Cough] : cough [Sore Throat] : sore throat [Anorexia] : anorexia [Fatigue] : fatigue [Headache] : headache [NSAIDs] : NSAIDs [Activity] : with activity [At Night] : at night [In Morning] : in the morning [Worsening] : worsening [Wheezing] : no wheezing [Chills] : no chills [Shortness Of Breath] : no shortness of breath [Earache] : no earache [Fever] : no fever [FreeTextEntry5] : Sudafed as directed [FreeTextEntry8] : this is a 67yo p/w "head cold", sx started 11/13/2021 as URI sx and reports worsening sx in chest now, reports 7/10 cough productive green sputum, reports sinus congestion and HAs; SLIDTA as discussed above. \par advised to stop Sudafed given hx htn.\par denies sick contacts for flu or covid19, fully vaccinated for COVID19.\par o/w in no acute distress, no other major concerns and reports feeling well. \par will evaluate and manage as appropriate.

## 2021-11-18 ENCOUNTER — TRANSCRIPTION ENCOUNTER (OUTPATIENT)
Age: 66
End: 2021-11-18

## 2021-11-18 LAB
ALBUMIN SERPL ELPH-MCNC: 4.5 G/DL
ALP BLD-CCNC: 57 U/L
ALT SERPL-CCNC: 35 U/L
ANION GAP SERPL CALC-SCNC: 15 MMOL/L
AST SERPL-CCNC: 19 U/L
BASOPHILS # BLD AUTO: 0.02 K/UL
BASOPHILS NFR BLD AUTO: 0.2 %
BILIRUB SERPL-MCNC: 0.6 MG/DL
BUN SERPL-MCNC: 24 MG/DL
CALCIUM SERPL-MCNC: 8.6 MG/DL
CHLORIDE SERPL-SCNC: 102 MMOL/L
CO2 SERPL-SCNC: 22 MMOL/L
CREAT SERPL-MCNC: 0.84 MG/DL
EOSINOPHIL # BLD AUTO: 0.16 K/UL
EOSINOPHIL NFR BLD AUTO: 1.4 %
GLUCOSE SERPL-MCNC: 119 MG/DL
HCT VFR BLD CALC: 40.5 %
HGB BLD-MCNC: 14 G/DL
IMM GRANULOCYTES NFR BLD AUTO: 0.3 %
LYMPHOCYTES # BLD AUTO: 2.46 K/UL
LYMPHOCYTES NFR BLD AUTO: 21.3 %
MAN DIFF?: NORMAL
MCHC RBC-ENTMCNC: 33.4 PG
MCHC RBC-ENTMCNC: 34.6 GM/DL
MCV RBC AUTO: 96.7 FL
MONOCYTES # BLD AUTO: 0.48 K/UL
MONOCYTES NFR BLD AUTO: 4.2 %
NEUTROPHILS # BLD AUTO: 8.38 K/UL
NEUTROPHILS NFR BLD AUTO: 72.6 %
PLATELET # BLD AUTO: 206 K/UL
POTASSIUM SERPL-SCNC: 4.2 MMOL/L
PROT SERPL-MCNC: 6.7 G/DL
RBC # BLD: 4.19 M/UL
RBC # FLD: 13.2 %
SARS-COV-2 N GENE NPH QL NAA+PROBE: NOT DETECTED
SODIUM SERPL-SCNC: 139 MMOL/L
WBC # FLD AUTO: 11.53 K/UL

## 2021-11-23 ENCOUNTER — FORM ENCOUNTER (OUTPATIENT)
Age: 66
End: 2021-11-23

## 2021-12-01 ENCOUNTER — FORM ENCOUNTER (OUTPATIENT)
Age: 66
End: 2021-12-01

## 2021-12-08 ENCOUNTER — NON-APPOINTMENT (OUTPATIENT)
Age: 66
End: 2021-12-08

## 2022-01-02 ENCOUNTER — FORM ENCOUNTER (OUTPATIENT)
Age: 67
End: 2022-01-02

## 2022-02-09 ENCOUNTER — INPATIENT (INPATIENT)
Facility: HOSPITAL | Age: 67
LOS: 3 days | Discharge: HOME | DRG: 917 | End: 2022-02-13
Attending: STUDENT IN AN ORGANIZED HEALTH CARE EDUCATION/TRAINING PROGRAM | Admitting: INTERNAL MEDICINE
Payer: MEDICARE

## 2022-02-09 VITALS
RESPIRATION RATE: 18 BRPM | DIASTOLIC BLOOD PRESSURE: 68 MMHG | OXYGEN SATURATION: 98 % | TEMPERATURE: 98 F | WEIGHT: 160.06 LBS | HEIGHT: 65 IN | HEART RATE: 77 BPM | SYSTOLIC BLOOD PRESSURE: 127 MMHG

## 2022-02-09 DIAGNOSIS — Z98.890 OTHER SPECIFIED POSTPROCEDURAL STATES: Chronic | ICD-10-CM

## 2022-02-09 DIAGNOSIS — T51.2X1A TOXIC EFFECT OF 2-PROPANOL, ACCIDENTAL (UNINTENTIONAL), INITIAL ENCOUNTER: ICD-10-CM

## 2022-02-09 LAB
ADD ON TEST-SPECIMEN IN LAB: SIGNIFICANT CHANGE UP
ADD ON TEST-SPECIMEN IN LAB: SIGNIFICANT CHANGE UP
ALBUMIN SERPL ELPH-MCNC: 3.7 G/DL — SIGNIFICANT CHANGE UP (ref 3.3–5)
ALP SERPL-CCNC: 48 U/L — SIGNIFICANT CHANGE UP (ref 40–120)
ALT FLD-CCNC: 64 U/L — SIGNIFICANT CHANGE UP (ref 12–78)
ANION GAP SERPL CALC-SCNC: 6 MMOL/L — SIGNIFICANT CHANGE UP (ref 5–17)
APAP SERPL-MCNC: <2 UG/ML — LOW (ref 10–30)
APPEARANCE UR: CLEAR — SIGNIFICANT CHANGE UP
APTT BLD: 28.7 SEC — SIGNIFICANT CHANGE UP (ref 27.5–35.5)
AST SERPL-CCNC: 36 U/L — SIGNIFICANT CHANGE UP (ref 15–37)
BASE EXCESS BLDA CALC-SCNC: 1 MMOL/L — SIGNIFICANT CHANGE UP (ref -2–3)
BASOPHILS # BLD AUTO: 0.05 K/UL — SIGNIFICANT CHANGE UP (ref 0–0.2)
BASOPHILS NFR BLD AUTO: 0.7 % — SIGNIFICANT CHANGE UP (ref 0–2)
BILIRUB SERPL-MCNC: 0.4 MG/DL — SIGNIFICANT CHANGE UP (ref 0.2–1.2)
BILIRUB UR-MCNC: NEGATIVE — SIGNIFICANT CHANGE UP
BLOOD GAS COMMENTS ARTERIAL: SIGNIFICANT CHANGE UP
BUN SERPL-MCNC: 19 MG/DL — SIGNIFICANT CHANGE UP (ref 7–23)
CALCIUM SERPL-MCNC: 8.9 MG/DL — SIGNIFICANT CHANGE UP (ref 8.5–10.1)
CHLORIDE SERPL-SCNC: 111 MMOL/L — HIGH (ref 96–108)
CO2 BLDA-SCNC: 27 MMOL/L — HIGH (ref 19–24)
CO2 SERPL-SCNC: 28 MMOL/L — SIGNIFICANT CHANGE UP (ref 22–31)
COLOR SPEC: YELLOW — SIGNIFICANT CHANGE UP
CREAT SERPL-MCNC: 0.94 MG/DL — SIGNIFICANT CHANGE UP (ref 0.5–1.3)
DIFF PNL FLD: NEGATIVE — SIGNIFICANT CHANGE UP
EOSINOPHIL # BLD AUTO: 0.37 K/UL — SIGNIFICANT CHANGE UP (ref 0–0.5)
EOSINOPHIL NFR BLD AUTO: 4.9 % — SIGNIFICANT CHANGE UP (ref 0–6)
ETHANOL SERPL-MCNC: 16 MG/DL — HIGH (ref 0–10)
GLUCOSE SERPL-MCNC: 101 MG/DL — HIGH (ref 70–99)
GLUCOSE UR QL: NEGATIVE — SIGNIFICANT CHANGE UP
HCO3 BLDA-SCNC: 26 MMOL/L — SIGNIFICANT CHANGE UP (ref 21–28)
HCT VFR BLD CALC: 38.9 % — SIGNIFICANT CHANGE UP (ref 34.5–45)
HGB BLD-MCNC: 13.2 G/DL — SIGNIFICANT CHANGE UP (ref 11.5–15.5)
IMM GRANULOCYTES NFR BLD AUTO: 0.1 % — SIGNIFICANT CHANGE UP (ref 0–1.5)
INR BLD: 0.92 RATIO — SIGNIFICANT CHANGE UP (ref 0.88–1.16)
KETONES UR-MCNC: NEGATIVE — SIGNIFICANT CHANGE UP
LEUKOCYTE ESTERASE UR-ACNC: ABNORMAL
LYMPHOCYTES # BLD AUTO: 4.35 K/UL — HIGH (ref 1–3.3)
LYMPHOCYTES # BLD AUTO: 57.8 % — HIGH (ref 13–44)
MAGNESIUM SERPL-MCNC: 2.1 MG/DL — SIGNIFICANT CHANGE UP (ref 1.6–2.6)
MCHC RBC-ENTMCNC: 32.7 PG — SIGNIFICANT CHANGE UP (ref 27–34)
MCHC RBC-ENTMCNC: 33.9 GM/DL — SIGNIFICANT CHANGE UP (ref 32–36)
MCV RBC AUTO: 96.3 FL — SIGNIFICANT CHANGE UP (ref 80–100)
MONOCYTES # BLD AUTO: 0.63 K/UL — SIGNIFICANT CHANGE UP (ref 0–0.9)
MONOCYTES NFR BLD AUTO: 8.4 % — SIGNIFICANT CHANGE UP (ref 2–14)
NEUTROPHILS # BLD AUTO: 2.12 K/UL — SIGNIFICANT CHANGE UP (ref 1.8–7.4)
NEUTROPHILS NFR BLD AUTO: 28.1 % — LOW (ref 43–77)
NITRITE UR-MCNC: NEGATIVE — SIGNIFICANT CHANGE UP
PCO2 BLDA: 42 MMHG — HIGH (ref 32–35)
PCP SPEC-MCNC: SIGNIFICANT CHANGE UP
PH BLDA: 7.4 — SIGNIFICANT CHANGE UP (ref 7.35–7.45)
PH UR: 6 — SIGNIFICANT CHANGE UP (ref 5–8)
PLATELET # BLD AUTO: 186 K/UL — SIGNIFICANT CHANGE UP (ref 150–400)
PO2 BLDA: 100 MMHG — SIGNIFICANT CHANGE UP (ref 83–108)
POTASSIUM SERPL-MCNC: 4 MMOL/L — SIGNIFICANT CHANGE UP (ref 3.5–5.3)
POTASSIUM SERPL-SCNC: 4 MMOL/L — SIGNIFICANT CHANGE UP (ref 3.5–5.3)
PROT SERPL-MCNC: 6.9 GM/DL — SIGNIFICANT CHANGE UP (ref 6–8.3)
PROT UR-MCNC: SIGNIFICANT CHANGE UP
PROTHROM AB SERPL-ACNC: 10.7 SEC — SIGNIFICANT CHANGE UP (ref 10.6–13.6)
RBC # BLD: 4.04 M/UL — SIGNIFICANT CHANGE UP (ref 3.8–5.2)
RBC # FLD: 12.8 % — SIGNIFICANT CHANGE UP (ref 10.3–14.5)
SALICYLATES SERPL-MCNC: <1.7 MG/DL — LOW (ref 2.8–20)
SAO2 % BLDA: 98 % — SIGNIFICANT CHANGE UP
SARS-COV-2 RNA SPEC QL NAA+PROBE: SIGNIFICANT CHANGE UP
SODIUM SERPL-SCNC: 145 MMOL/L — SIGNIFICANT CHANGE UP (ref 135–145)
SP GR SPEC: 1.01 — SIGNIFICANT CHANGE UP (ref 1.01–1.02)
TROPONIN I, HIGH SENSITIVITY RESULT: 8.25 NG/L — SIGNIFICANT CHANGE UP
UROBILINOGEN FLD QL: NEGATIVE — SIGNIFICANT CHANGE UP
WBC # BLD: 7.53 K/UL — SIGNIFICANT CHANGE UP (ref 3.8–10.5)
WBC # FLD AUTO: 7.53 K/UL — SIGNIFICANT CHANGE UP (ref 3.8–10.5)

## 2022-02-09 PROCEDURE — 82803 BLOOD GASES ANY COMBINATION: CPT

## 2022-02-09 PROCEDURE — 93005 ELECTROCARDIOGRAM TRACING: CPT

## 2022-02-09 PROCEDURE — 83930 ASSAY OF BLOOD OSMOLALITY: CPT

## 2022-02-09 PROCEDURE — 86803 HEPATITIS C AB TEST: CPT

## 2022-02-09 PROCEDURE — 82607 VITAMIN B-12: CPT

## 2022-02-09 PROCEDURE — 82140 ASSAY OF AMMONIA: CPT

## 2022-02-09 PROCEDURE — 36415 COLL VENOUS BLD VENIPUNCTURE: CPT

## 2022-02-09 PROCEDURE — 82693 ASSAY OF ETHYLENE GLYCOL: CPT

## 2022-02-09 PROCEDURE — 82962 GLUCOSE BLOOD TEST: CPT

## 2022-02-09 PROCEDURE — 80048 BASIC METABOLIC PNL TOTAL CA: CPT

## 2022-02-09 PROCEDURE — 93010 ELECTROCARDIOGRAM REPORT: CPT

## 2022-02-09 PROCEDURE — 84100 ASSAY OF PHOSPHORUS: CPT

## 2022-02-09 PROCEDURE — 80053 COMPREHEN METABOLIC PANEL: CPT

## 2022-02-09 PROCEDURE — 85027 COMPLETE CBC AUTOMATED: CPT

## 2022-02-09 PROCEDURE — 70450 CT HEAD/BRAIN W/O DYE: CPT | Mod: 26,MA

## 2022-02-09 PROCEDURE — 71045 X-RAY EXAM CHEST 1 VIEW: CPT | Mod: 26

## 2022-02-09 PROCEDURE — G0480: CPT

## 2022-02-09 PROCEDURE — 82009 KETONE BODYS QUAL: CPT

## 2022-02-09 PROCEDURE — 85025 COMPLETE CBC W/AUTO DIFF WBC: CPT

## 2022-02-09 PROCEDURE — 80307 DRUG TEST PRSMV CHEM ANLYZR: CPT

## 2022-02-09 PROCEDURE — 99285 EMERGENCY DEPT VISIT HI MDM: CPT

## 2022-02-09 PROCEDURE — 82248 BILIRUBIN DIRECT: CPT

## 2022-02-09 PROCEDURE — 83735 ASSAY OF MAGNESIUM: CPT

## 2022-02-09 PROCEDURE — 81003 URINALYSIS AUTO W/O SCOPE: CPT

## 2022-02-09 RX ORDER — SODIUM CHLORIDE 9 MG/ML
1000 INJECTION INTRAMUSCULAR; INTRAVENOUS; SUBCUTANEOUS ONCE
Refills: 0 | Status: COMPLETED | OUTPATIENT
Start: 2022-02-09 | End: 2022-02-09

## 2022-02-09 RX ADMIN — SODIUM CHLORIDE 1000 MILLILITER(S): 9 INJECTION INTRAMUSCULAR; INTRAVENOUS; SUBCUTANEOUS at 18:54

## 2022-02-09 RX ADMIN — SODIUM CHLORIDE 1000 MILLILITER(S): 9 INJECTION INTRAMUSCULAR; INTRAVENOUS; SUBCUTANEOUS at 23:08

## 2022-02-09 NOTE — ED ADULT NURSE REASSESSMENT NOTE - NS ED NURSE REASSESS COMMENT FT1
Efrain and son updated on pt status, pt arousalable at this time but drowsy, no c/o chest pain, SOB or pain reported at this time. Awaiting MD reassessment.

## 2022-02-09 NOTE — ED PROVIDER NOTE - CONSTITUTIONAL, MLM
normal... Well appearing, awake, alert, oriented to person, place, time/situation and in no apparent distress. Withdraws to pain. Alcohol on breath. Verbally responds to pain.

## 2022-02-09 NOTE — ED PROVIDER NOTE - NSICDXPASTSURGICALHX_GEN_ALL_CORE_FT
PAST SURGICAL HISTORY:   delivery delivered  &     S/P LASIK surgery of both eyes 2013    Status post lateral meniscus repair 2012    Status post tendon repair Left Foot as a teenager

## 2022-02-09 NOTE — ED PROVIDER NOTE - CARE PLAN
1 Principal Discharge DX:	Isopropyl alcohol poisoning  Secondary Diagnosis:	Alcohol abuse  Secondary Diagnosis:	Syncope

## 2022-02-09 NOTE — ED ADULT TRIAGE NOTE - CHIEF COMPLAINT QUOTE
Pt brought in by ambulance from home after  found patient "passed out". Pt with known ETOH abuse, attempted to stop drinking 2 days ago and  states that she started drinking again today. AOB. Pt arousable to verbal intermittently. Pt arousable to painful stimuli.

## 2022-02-09 NOTE — ED ADULT NURSE REASSESSMENT NOTE - NS ED NURSE REASSESS COMMENT FT1
pt difficulty to arousable on RN exam but does not speak. pt opens eyes intermittently. AOB. NPA inserted; pt awoke but only for a few seconds and fell back to sleep. pt attached to CO2 monitor and pulse 02. MD Chahal made aware. pt clothing removed; no obvious trauma noted; warm blankets applied. will ctm.

## 2022-02-09 NOTE — ED PROVIDER NOTE - OBJECTIVE STATEMENT
65 y/o female w/ a PMhx of essential HTN, HLD, a torn meniscus, and ETOH abuse presents to the ED via EMS from home for AMS. As per EMS,  found pt passed out. As per , pt tried to stop drinking x2 days ago but starting drinking again today. Withdraws to pain. Unable to obtain history secondary to intoxication.

## 2022-02-09 NOTE — ED ADULT NURSE NOTE - OBJECTIVE STATEMENT
Pt brought in by ambulance from home after  found patient "passed out". Pt with known ETOH abuse, attempted to stop drinking 2 days ago and  states that she started drinking again today. pt difficult to arouse on RN exam but does not speak. pt opens eyes intermittently. AOB.

## 2022-02-10 DIAGNOSIS — Z98.890 OTHER SPECIFIED POSTPROCEDURAL STATES: Chronic | ICD-10-CM

## 2022-02-10 LAB
ACETONE SERPL-MCNC: NEGATIVE — SIGNIFICANT CHANGE UP
ALBUMIN SERPL ELPH-MCNC: 3.2 G/DL — LOW (ref 3.3–5)
ALP SERPL-CCNC: 40 U/L — SIGNIFICANT CHANGE UP (ref 40–120)
ALT FLD-CCNC: 47 U/L — SIGNIFICANT CHANGE UP (ref 12–78)
AMMONIA BLD-MCNC: 24 UMOL/L — SIGNIFICANT CHANGE UP (ref 11–32)
ANION GAP SERPL CALC-SCNC: 5 MMOL/L — SIGNIFICANT CHANGE UP (ref 5–17)
AST SERPL-CCNC: 17 U/L — SIGNIFICANT CHANGE UP (ref 15–37)
BASE EXCESS BLDV CALC-SCNC: 2.8 MMOL/L — SIGNIFICANT CHANGE UP
BASOPHILS # BLD AUTO: 0.03 K/UL — SIGNIFICANT CHANGE UP (ref 0–0.2)
BASOPHILS NFR BLD AUTO: 0.3 % — SIGNIFICANT CHANGE UP (ref 0–2)
BILIRUB SERPL-MCNC: 0.7 MG/DL — SIGNIFICANT CHANGE UP (ref 0.2–1.2)
BUN SERPL-MCNC: 9 MG/DL — SIGNIFICANT CHANGE UP (ref 7–23)
CALCIUM SERPL-MCNC: 8 MG/DL — LOW (ref 8.5–10.1)
CHLORIDE SERPL-SCNC: 112 MMOL/L — HIGH (ref 96–108)
CO2 BLDV-SCNC: 28 MMOL/L — HIGH (ref 22–26)
CO2 SERPL-SCNC: 27 MMOL/L — SIGNIFICANT CHANGE UP (ref 22–31)
CREAT SERPL-MCNC: 1.75 MG/DL — HIGH (ref 0.5–1.3)
EOSINOPHIL # BLD AUTO: 0.17 K/UL — SIGNIFICANT CHANGE UP (ref 0–0.5)
EOSINOPHIL NFR BLD AUTO: 1.9 % — SIGNIFICANT CHANGE UP (ref 0–6)
ETHANOL SERPL-MCNC: <10 MG/DL — SIGNIFICANT CHANGE UP (ref 0–10)
GAS PNL BLDV: SIGNIFICANT CHANGE UP
GLUCOSE SERPL-MCNC: 102 MG/DL — HIGH (ref 70–99)
HCO3 BLDV-SCNC: 27 MMOL/L — SIGNIFICANT CHANGE UP (ref 22–29)
HCT VFR BLD CALC: 35.2 % — SIGNIFICANT CHANGE UP (ref 34.5–45)
HCV AB S/CO SERPL IA: 0.08 S/CO — SIGNIFICANT CHANGE UP (ref 0–0.99)
HCV AB SERPL-IMP: SIGNIFICANT CHANGE UP
HGB BLD-MCNC: 12.1 G/DL — SIGNIFICANT CHANGE UP (ref 11.5–15.5)
IMM GRANULOCYTES NFR BLD AUTO: 0.2 % — SIGNIFICANT CHANGE UP (ref 0–1.5)
LYMPHOCYTES # BLD AUTO: 3.5 K/UL — HIGH (ref 1–3.3)
LYMPHOCYTES # BLD AUTO: 38.1 % — SIGNIFICANT CHANGE UP (ref 13–44)
MAGNESIUM SERPL-MCNC: 1.7 MG/DL — SIGNIFICANT CHANGE UP (ref 1.6–2.6)
MCHC RBC-ENTMCNC: 33.1 PG — SIGNIFICANT CHANGE UP (ref 27–34)
MCHC RBC-ENTMCNC: 34.4 GM/DL — SIGNIFICANT CHANGE UP (ref 32–36)
MCV RBC AUTO: 96.2 FL — SIGNIFICANT CHANGE UP (ref 80–100)
MONOCYTES # BLD AUTO: 0.65 K/UL — SIGNIFICANT CHANGE UP (ref 0–0.9)
MONOCYTES NFR BLD AUTO: 7.1 % — SIGNIFICANT CHANGE UP (ref 2–14)
NEUTROPHILS # BLD AUTO: 4.81 K/UL — SIGNIFICANT CHANGE UP (ref 1.8–7.4)
NEUTROPHILS NFR BLD AUTO: 52.4 % — SIGNIFICANT CHANGE UP (ref 43–77)
OSMOLALITY SERPL: 337 MOSMOL/KG — HIGH (ref 280–301)
PCO2 BLDV: 40 MMHG — SIGNIFICANT CHANGE UP (ref 39–42)
PH BLDV: 7.44 — HIGH (ref 7.32–7.43)
PLATELET # BLD AUTO: 175 K/UL — SIGNIFICANT CHANGE UP (ref 150–400)
PO2 BLDV: 84 MMHG — SIGNIFICANT CHANGE UP
POTASSIUM SERPL-MCNC: 3.3 MMOL/L — LOW (ref 3.5–5.3)
POTASSIUM SERPL-SCNC: 3.3 MMOL/L — LOW (ref 3.5–5.3)
PROT SERPL-MCNC: 6 GM/DL — SIGNIFICANT CHANGE UP (ref 6–8.3)
RBC # BLD: 3.66 M/UL — LOW (ref 3.8–5.2)
RBC # FLD: 12.9 % — SIGNIFICANT CHANGE UP (ref 10.3–14.5)
SAO2 % BLDV: 97.9 % — SIGNIFICANT CHANGE UP
SODIUM SERPL-SCNC: 144 MMOL/L — SIGNIFICANT CHANGE UP (ref 135–145)
WBC # BLD: 9.18 K/UL — SIGNIFICANT CHANGE UP (ref 3.8–10.5)
WBC # FLD AUTO: 9.18 K/UL — SIGNIFICANT CHANGE UP (ref 3.8–10.5)

## 2022-02-10 PROCEDURE — 93010 ELECTROCARDIOGRAM REPORT: CPT

## 2022-02-10 PROCEDURE — 99223 1ST HOSP IP/OBS HIGH 75: CPT

## 2022-02-10 RX ORDER — THIAMINE MONONITRATE (VIT B1) 100 MG
500 TABLET ORAL EVERY 8 HOURS
Refills: 0 | Status: DISCONTINUED | OUTPATIENT
Start: 2022-02-10 | End: 2022-02-13

## 2022-02-10 RX ORDER — ATORVASTATIN CALCIUM 80 MG/1
40 TABLET, FILM COATED ORAL AT BEDTIME
Refills: 0 | Status: DISCONTINUED | OUTPATIENT
Start: 2022-02-10 | End: 2022-02-13

## 2022-02-10 RX ORDER — SODIUM CHLORIDE 9 MG/ML
50 INJECTION, SOLUTION INTRAVENOUS
Refills: 0 | Status: COMPLETED | OUTPATIENT
Start: 2022-02-10 | End: 2022-02-10

## 2022-02-10 RX ORDER — SODIUM CHLORIDE 9 MG/ML
1000 INJECTION INTRAMUSCULAR; INTRAVENOUS; SUBCUTANEOUS
Refills: 0 | Status: DISCONTINUED | OUTPATIENT
Start: 2022-02-10 | End: 2022-02-13

## 2022-02-10 RX ORDER — ESCITALOPRAM OXALATE 10 MG/1
20 TABLET, FILM COATED ORAL DAILY
Refills: 0 | Status: DISCONTINUED | OUTPATIENT
Start: 2022-02-10 | End: 2022-02-13

## 2022-02-10 RX ORDER — FOLIC ACID 0.8 MG
70 TABLET ORAL
Refills: 0 | Status: DISCONTINUED | OUTPATIENT
Start: 2022-02-10 | End: 2022-02-10

## 2022-02-10 RX ORDER — FOLIC ACID 0.8 MG
50 TABLET ORAL
Refills: 0 | Status: DISCONTINUED | OUTPATIENT
Start: 2022-02-10 | End: 2022-02-10

## 2022-02-10 RX ORDER — THIAMINE MONONITRATE (VIT B1) 100 MG
100 TABLET ORAL DAILY
Refills: 0 | Status: DISCONTINUED | OUTPATIENT
Start: 2022-02-10 | End: 2022-02-10

## 2022-02-10 RX ORDER — ESCITALOPRAM OXALATE 10 MG/1
1 TABLET, FILM COATED ORAL
Qty: 0 | Refills: 0 | DISCHARGE

## 2022-02-10 RX ORDER — THIAMINE MONONITRATE (VIT B1) 100 MG
100 TABLET ORAL ONCE
Refills: 0 | Status: COMPLETED | OUTPATIENT
Start: 2022-02-10 | End: 2022-02-10

## 2022-02-10 RX ORDER — ACETAMINOPHEN 500 MG
650 TABLET ORAL EVERY 6 HOURS
Refills: 0 | Status: DISCONTINUED | OUTPATIENT
Start: 2022-02-10 | End: 2022-02-13

## 2022-02-10 RX ORDER — ONDANSETRON 8 MG/1
4 TABLET, FILM COATED ORAL EVERY 6 HOURS
Refills: 0 | Status: DISCONTINUED | OUTPATIENT
Start: 2022-02-10 | End: 2022-02-13

## 2022-02-10 RX ORDER — LOSARTAN POTASSIUM 100 MG/1
100 TABLET, FILM COATED ORAL DAILY
Refills: 0 | Status: DISCONTINUED | OUTPATIENT
Start: 2022-02-10 | End: 2022-02-13

## 2022-02-10 RX ORDER — ATORVASTATIN CALCIUM 80 MG/1
1 TABLET, FILM COATED ORAL
Qty: 0 | Refills: 0 | DISCHARGE

## 2022-02-10 RX ORDER — POTASSIUM CHLORIDE 20 MEQ
40 PACKET (EA) ORAL DAILY
Refills: 0 | Status: DISCONTINUED | OUTPATIENT
Start: 2022-02-10 | End: 2022-02-13

## 2022-02-10 RX ORDER — LOSARTAN POTASSIUM 100 MG/1
1 TABLET, FILM COATED ORAL
Qty: 0 | Refills: 0 | DISCHARGE

## 2022-02-10 RX ORDER — FOLIC ACID 0.8 MG
1 TABLET ORAL ONCE
Refills: 0 | Status: DISCONTINUED | OUTPATIENT
Start: 2022-02-10 | End: 2022-02-10

## 2022-02-10 RX ORDER — SODIUM CHLORIDE 9 MG/ML
50 INJECTION, SOLUTION INTRAVENOUS
Refills: 0 | Status: DISCONTINUED | OUTPATIENT
Start: 2022-02-10 | End: 2022-02-10

## 2022-02-10 RX ORDER — CHOLECALCIFEROL (VITAMIN D3) 125 MCG
4000 CAPSULE ORAL DAILY
Refills: 0 | Status: DISCONTINUED | OUTPATIENT
Start: 2022-02-10 | End: 2022-02-13

## 2022-02-10 RX ORDER — ASCORBIC ACID 60 MG
500 TABLET,CHEWABLE ORAL DAILY
Refills: 0 | Status: DISCONTINUED | OUTPATIENT
Start: 2022-02-10 | End: 2022-02-13

## 2022-02-10 RX ORDER — FOLIC ACID 0.8 MG
1 TABLET ORAL DAILY
Refills: 0 | Status: DISCONTINUED | OUTPATIENT
Start: 2022-02-10 | End: 2022-02-10

## 2022-02-10 RX ORDER — ASPIRIN/CALCIUM CARB/MAGNESIUM 324 MG
1 TABLET ORAL
Qty: 0 | Refills: 0 | DISCHARGE

## 2022-02-10 RX ADMIN — Medication 2 MILLIGRAM(S): at 01:16

## 2022-02-10 RX ADMIN — Medication 255 MILLIGRAM(S): at 14:07

## 2022-02-10 RX ADMIN — Medication 1.5 MILLIGRAM(S): at 22:11

## 2022-02-10 RX ADMIN — Medication 2 MILLIGRAM(S): at 09:58

## 2022-02-10 RX ADMIN — Medication 650 MILLIGRAM(S): at 18:27

## 2022-02-10 RX ADMIN — SODIUM CHLORIDE 75 MILLILITER(S): 9 INJECTION INTRAMUSCULAR; INTRAVENOUS; SUBCUTANEOUS at 06:40

## 2022-02-10 RX ADMIN — ESCITALOPRAM OXALATE 20 MILLIGRAM(S): 10 TABLET, FILM COATED ORAL at 09:59

## 2022-02-10 RX ADMIN — Medication 650 MILLIGRAM(S): at 09:58

## 2022-02-10 RX ADMIN — Medication 2 MILLIGRAM(S): at 06:24

## 2022-02-10 RX ADMIN — Medication 2 MILLIGRAM(S): at 18:26

## 2022-02-10 RX ADMIN — Medication 255 MILLIGRAM(S): at 05:11

## 2022-02-10 RX ADMIN — Medication 2 MILLIGRAM(S): at 14:07

## 2022-02-10 RX ADMIN — Medication 1 TABLET(S): at 09:59

## 2022-02-10 RX ADMIN — Medication 100 MILLIGRAM(S): at 01:21

## 2022-02-10 RX ADMIN — SODIUM CHLORIDE 100 MILLILITER(S): 9 INJECTION, SOLUTION INTRAVENOUS at 06:23

## 2022-02-10 RX ADMIN — LOSARTAN POTASSIUM 100 MILLIGRAM(S): 100 TABLET, FILM COATED ORAL at 09:59

## 2022-02-10 RX ADMIN — Medication 40 MILLIEQUIVALENT(S): at 18:26

## 2022-02-10 RX ADMIN — Medication 255 MILLIGRAM(S): at 22:12

## 2022-02-10 RX ADMIN — SODIUM CHLORIDE 75 MILLILITER(S): 9 INJECTION INTRAMUSCULAR; INTRAVENOUS; SUBCUTANEOUS at 01:23

## 2022-02-10 RX ADMIN — Medication 4000 UNIT(S): at 09:58

## 2022-02-10 RX ADMIN — ATORVASTATIN CALCIUM 40 MILLIGRAM(S): 80 TABLET, FILM COATED ORAL at 22:11

## 2022-02-10 RX ADMIN — Medication 500 MILLIGRAM(S): at 09:59

## 2022-02-10 NOTE — H&P ADULT - NSICDXPASTMEDICALHX_GEN_ALL_CORE_FT
PAST MEDICAL HISTORY:  Essential hypertension     Hyperlipemia     Torn meniscus      PAST MEDICAL HISTORY:  Anxiety with depression     Essential hypertension     Hyperlipemia     Torn meniscus

## 2022-02-10 NOTE — SBIRT NOTE ADULT - NSSBIRTALCPASSREFTXDET_GEN_A_CORE
SW met with pt at bedside, pt was A&Ox4, pleasant and cooperative with assessment. Pt discussed her history of binge drinking and how she has been struggling with her etoh abuse since 2013. Pt reports currently being in outpt tx with [facility] and receives the vivitrol injection monthly. Pt has support from her family and is motivated for inpt tx at this priti. SW informed pt that son, Luis A, found out that [facility] is in network with her insurance. Pt stated she would like for [facility] to be the last option and wants to look into different options. SW left resources list and f/u later in afternoon. Pt wants to review options with her . SW to follow for referral choice.

## 2022-02-10 NOTE — H&P ADULT - NSICDXPASTSURGICALHX_GEN_ALL_CORE_FT
PAST SURGICAL HISTORY:   delivery delivered  &     S/P LASIK surgery of both eyes 2013    Status post lateral meniscus repair 2012    Status post tendon repair Left Foot as a teenager     PAST SURGICAL HISTORY:   delivery delivered  &     History of arthroscopy of left knee 2017    S/P LASIK surgery of both eyes 2013    Status post lateral meniscus repair     Status post tendon repair Left Foot as a teenager

## 2022-02-10 NOTE — CONSULT NOTE ADULT - ASSESSMENT
·	Isopropyl ingestions generally tend to have a high OG with normal AG.  ·	Please send a fresh set of VBG, Serum Osm, ETOH level, BMP, Serum Glucose and a serum acetone level.   ·	There is no need to give Fomepizole to the patient as Isopropyl alcohol is metabolized to acetone via Alc. DH enzyme pathway.   ·	Treatment advised is supportive.   ·	Please administer Thiamine and Folic acid.   ·	Will continue to follow.    Thank you for the consult.  ·	Isopropyl ingestions generally tend to have a high OG with normal AG.  ·	Please send a fresh set of VBG, Serum Osm, ETOH level, BMP, Serum Glucose and a serum acetone level.   ·	There is no need to give Fomepizole to the patient as Isopropyl alcohol is metabolized to acetone via Alc. DH enzyme pathway.   ·	Treatment advised is supportive.   ·	Please administer Thiamine 500 mg Q8H and Folic acid 1-2 mg/Kg Q6H.   ·	Will continue to follow.    Thank you for the consult.  ·	Isopropyl ingestions generally tend to have a high OG with normal AG.  ·	Please send a fresh set of VBG, Serum Osm, ETOH level, BMP, Serum Glucose and a serum acetone level.   ·	There is no need to give Fomepizole to the patient as Isopropyl alcohol is metabolized to acetone via Alc. DH enzyme pathway.   ·	Treatment advised is supportive.   ·	Please administer Thiamine 500 mg Q8H and Folic acid 1-2 mg/Kg Q6H.   ·	Will continue to follow.    d/w Dr. Daniel Salinas - Toxicology Attending of record.    Thank you for the consult.  ·	Isopropyl ingestions generally tend to have a high OG with normal AG.  ·	Please send a fresh set of VBG, Serum Osm, ETOH level, BMP, Serum Glucose and a serum acetone level.   ·	Be aware that there can be a false elevation of Creatinine d/t Isopropyl alcohol as a result of Remington's reaction.  ·	There is no need to give Fomepizole to the patient as Isopropyl alcohol is metabolized to acetone via Alc. DH enzyme pathway.   ·	Treatment advised is supportive.   ·	Please administer Thiamine 500 mg Q8H and Folic acid 1-2 mg/Kg Q6H.   ·	Will continue to follow.    d/w Dr. Daniel Salinas - Toxicology Attending of record.    Thank you for the consult.  ·	Isopropyl ingestions generally tend to have a high OG with normal AG.  ·	Please send a fresh set of VBG, Serum Osm, ETOH level, BMP, Serum Glucose and a serum acetone level.   ·	Be aware that there can be a false elevation of Creatinine d/t Isopropyl alcohol as a result of Remington's reaction.  ·	There is no need to give Fomepizole to the patient as Isopropyl alcohol is metabolized to acetone via Alc. DH enzyme pathway.   ·	Treatment advised is supportive.   ·	Please administer Thiamine 500 mg Q8H and Folic acid 1-2 mg/Kg Q6H.   ·	Will continue to follow.    d/w Dr. Daniel Salinas - Toxicology Attending of record.    Thank you for the consult.     Attending Brad:  67 y/o f hx of etoh abuse, reported to have drank rubbing alcohol (isopropyl alcohol) earlier tonight, presented w/ depressed mental status which is now improving, vitals stable, no reports of withdrawal form alcohol on physical exam. Labs unremarakble w/ normal creatinine, bicarb, AG.   Agree with above. Isopropyl alcohol does not cause sig end organ damage other than CNS depression and can cause hemorrhagic gastritis as is a gastric irritant. However does not require fomepizole. Given normal labwork unlikely mistaken ingestion of ethylene glycol or methanol however to be safe would send repeat bmp and vbg to ensure no developing acidosis.   -Thiamine and folate  -Monitor for etoh withdrawal with benzos prn .

## 2022-02-10 NOTE — H&P ADULT - NSHPPHYSICALEXAM_GEN_ALL_CORE
Vital Signs Last 24 Hrs  T(C): 36.9 (09 Feb 2022 23:18), Max: 36.9 (09 Feb 2022 23:18)  T(F): 98.4 (09 Feb 2022 23:18), Max: 98.4 (09 Feb 2022 23:18)  HR: 81 (10 Feb 2022 00:28) (60 - 81)  BP: 137/65 (10 Feb 2022 00:28) (116/61 - 148/79)  BP(mean): 85 (10 Feb 2022 00:28) (85 - 99)  RR: 20 (10 Feb 2022 00:28) (15 - 20)  SpO2: 98% (10 Feb 2022 00:28) (93% - 99%)

## 2022-02-10 NOTE — PATIENT PROFILE ADULT - FALL HARM RISK - HARM RISK INTERVENTIONS
Assistance with ambulation/Assistance OOB with selected safe patient handling equipment/Communicate Risk of Fall with Harm to all staff/Monitor for mental status changes/Monitor gait and stability/Reinforce activity limits and safety measures with patient and family/Tailored Fall Risk Interventions/Toileting schedule using arm’s reach rule for commode and bathroom/Use of alarms - bed, chair and/or voice tab/Visual Cue: Yellow wristband and red socks/Bed in lowest position, wheels locked, appropriate side rails in place/Call bell, personal items and telephone in reach/Instruct patient to call for assistance before getting out of bed or chair/Non-slip footwear when patient is out of bed/[place] to call system/Physically safe environment - no spills, clutter or unnecessary equipment/Purposeful Proactive Rounding/Room/bathroom lighting operational, light cord in reach

## 2022-02-10 NOTE — H&P ADULT - ASSESSMENT
67 y/o F PMHx significant for Hypertension, Hyperlipidemia, and alcohol abuse was BIBA from home for further evaluation and management of altered mental status. As per EMS the patient was found unresponsive at home by the patient's . Of note the patient attempted to stop drinking 2 days ago, but started drinking again today. Upon arrival to triage the patient was only arousable to noxious stimuli. HPI obtained from ED staff, EMS, and patient's  due to the patient's mental status. In the ED Toxicology was consulted. As the patient's mental status improved she admitted to ingesting "rubbing alcohol".  Vital Signs in Triage => /68, HR 77/min, RR 18/min, TMax 97.7'F, SaO2 98%.    #Acute Isopropyl Alcohol Toxicity  #Ethyl Alcohol Withdrawal  ~admit to Telemetry  ~f/u w/ Toxicology  ~trend osmolar gap as isopropyl ingestions generally are elevated with normal anion gap  ~f/u repeat VBG  ~f/u Serum Osm, ETOH level, BMP, Serum Glucose and a serum acetone level.   ~as per Toxicology treatment is supportive as Isopropyl alcohol is metabolized to acetone via Alcohol Dehydrogenase enzyme pathway  ~no need for Fomepizole  ~per Toxicology administer Thiamine 500mg IV q8h + Folic acid 1mg/kg IV q6h  ~f/u am labs  ~cont. CIWA protocol    #Hypertension  ~cont. Losartan 100mg po daily    #Hyperlipidemia  ~cont. Atorvastatin 40mg po qhs    #Depression  ~cont. Escitalopram 20mg po daily    #Vte ppx  ~IMPROVE Vte Risk Score is 1  ~cont. SCDs for now

## 2022-02-10 NOTE — PROGRESS NOTE ADULT - SUBJECTIVE AND OBJECTIVE BOX
Subjective:  Patient is a 66y old  Female who presents with a chief complaint of Altered Mental Status (10 Feb 2022 01:46)    HPI:  67 y/o F PMHx significant for Hypertension, Hyperlipidemia, and alcohol abuse was BIBA from home for further evaluation and management of altered mental status. As per EMS the patient was found unresponsive at home by the patient's . Of note the patient attempted to stop drinking 2 days ago, but started drinking again today. Upon arrival to triage the patient was only arousable to noxious stimuli. HPI obtained from ED staff, EMS, and patient's  due to the patient's mental status. In the ED Toxicology was consulted. As the patient's mental status improved she admitted to ingesting "rubbing alcohol".  Vital Signs in Triage => /68, HR 77/min, RR 18/min, TMax 97.7'F, SaO2 98%.   (10 Feb 2022 00:38)      Patient seen and examined at bedside earlier today,       OBJECTIVE:   T(C): 36.9 (02-10-22 @ 15:34), Max: 36.9 (22 @ 23:18)  HR: 84 (02-10-22 @ 15:34) (60 - 91)  BP: 120/67 (02-10-22 @ 15:34) (116/61 - 148/79)  RR: 17 (02-10-22 @ 15:34) (15 - 20)  SpO2: 96% (02-10-22 @ 15:34) (93% - 99%)  Wt(kg): --  Daily Height in cm: 165.1 (2022 17:23)    PHYSICAL EXAM:  GENERAL: NAD  NERVOUS SYSTEM:  Alert & Oriented X3, non- focal exam,  Motor Strength 5/5 B/L upper and lower extremities; DTRs 2+ intact and symmetric  HEAD:  Atraumatic, Normocephalic  EYES: EOMI, PERRLA, conjunctiva and sclera clear  HEENT: Moist mucous membranes  NECK: Supple, No JVD  CHEST/LUNG: Clear to auscultation bilaterally; No rales, no rhonchi, no wheezing  HEART: Regular rate and rhythm; No murmurs, no rubs or gallops  ABDOMEN: Soft, Nontender, Nondistended; Bowel sounds present  GENITOURINARY- Voiding, no suprapubic tenderness  EXTREMITIES:  2+ Peripheral Pulses, No clubbing, cyanosis,   edema  MUSCULOSKELETAL:- No muscle tenderness, Muscle tone normal, No joint tenderness, no Joint swelling,  Joint ROM -normal  SKIN-no rash, no lesion    LABS: all reviewed                         12.1     )-----------( 175      ( 10 Feb 2022 08:13 )             35.2     02-10    144  |  112<H>  |  9   ----------------------------<  102<H>  3.3<L>   |  27  |  1.75<H>    Ca    8.0<L>      10 Feb 2022 08:13  Mg     1.7     02-10    TPro  6.0  /  Alb  3.2<L>  /  TBili  0.7  /  DBili  0.2  /  AST  17  /  ALT  47  /  AlkPhos  40  02-10    PT/INR - ( 2022 18:30 )   PT: 10.7 sec;   INR: 0.92 ratio         PTT - ( 2022 18:30 )  PTT:28.7 sec      Urinalysis Basic - ( 2022 19:59 )    Color: Yellow / Appearance: Clear / S.010 / pH: x  Gluc: x / Ketone: Negative  / Bili: Negative / Urobili: Negative   Blood: x / Protein: See Note / Nitrite: Negative   Leuk Esterase: Small / RBC: 3-5 /HPF / WBC 6-10   Sq Epi: x / Non Sq Epi: Occasional / Bacteria: Occasional        CAPILLARY BLOOD GLUCOSE      POCT Blood Glucose.: 122 mg/dL (10 Feb 2022 00:37)  POCT Blood Glucose.: 106 mg/dL (2022 18:59)    ABG - ( 2022 20:18 )  pH, Arterial: 7.40  pH, Blood: x     /  pCO2: 42    /  pO2: 100   / HCO3: 26    / Base Excess: 1.0   /  SaO2: 98                  RECENT CULTURES:    RADIOLOGY & ADDITIONAL TESTS: all reviewed   EKG reviewed        Current medications:  acetaminophen     Tablet .. 650 milliGRAM(s) Oral every 6 hours PRN  ascorbic acid 500 milliGRAM(s) Oral daily  atorvastatin 40 milliGRAM(s) Oral at bedtime  cholecalciferol 4000 Unit(s) Oral daily  escitalopram 20 milliGRAM(s) Oral daily  folic acid 50 milliGRAM(s),dextrose 5% 500 milliLiter(s) 50 milliGRAM(s) IV Intermittent every 6 hours  LORazepam   Injectable   IV Push   LORazepam   Injectable 2 milliGRAM(s) IV Push every 2 hours PRN  LORazepam   Injectable 2 milliGRAM(s) IV Push every 1 hour PRN  LORazepam   Injectable 2 milliGRAM(s) IV Push every 4 hours  LORazepam   Injectable 1.5 milliGRAM(s) IV Push every 4 hours  losartan 100 milliGRAM(s) Oral daily  multivitamin 1 Tablet(s) Oral daily  ondansetron Injectable 4 milliGRAM(s) IV Push every 6 hours PRN  sodium chloride 0.9%. 1000 milliLiter(s) IV Continuous <Continuous>  thiamine IVPB 500 milliGRAM(s) IV Intermittent every 8 hours             Subjective:  Patient is a 66y old  Female who presents with a chief complaint of Altered Mental Status (10 Feb 2022 01:46)    HPI:  67 y/o F PMHx significant for Hypertension, Hyperlipidemia, and alcohol abuse was BIBA from home for further evaluation and management of altered mental status. As per EMS the patient was found unresponsive at home by the patient's . Of note the patient attempted to stop drinking 2 days ago, but started drinking again today. Upon arrival to triage the patient was only arousable to noxious stimuli. HPI obtained from ED staff, EMS, and patient's  due to the patient's mental status. In the ED Toxicology was consulted. As the patient's mental status improved she admitted to ingesting "rubbing alcohol".  Vital Signs in Triage => /68, HR 77/min, RR 18/min, TMax 97.7'F, SaO2 98%.   (10 Feb 2022 00:38)      Patient seen and examined at bedside earlier today,  (+) headache         OBJECTIVE:   T(C): 36.9 (02-10-22 @ 15:34), Max: 36.9 (22 @ 23:18)  HR: 84 (02-10-22 @ 15:34) (60 - 91)  BP: 120/67 (02-10-22 @ 15:34) (116/61 - 148/79)  RR: 17 (02-10-22 @ 15:34) (15 - 20)  SpO2: 96% (02-10-22 @ 15:34) (93% - 99%)  Wt(kg): --  Daily Height in cm: 165.1 (2022 17:23)    PHYSICAL EXAM:  GENERAL: NAD  NERVOUS SYSTEM:  Alert & Oriented X3, non- focal exam,  Motor Strength 5/5 B/L upper and lower extremities; DTRs 2+ intact and symmetric  HEAD:  Atraumatic, Normocephalic  EYES: EOMI, PERRLA, conjunctiva and sclera clear  HEENT: Moist mucous membranes  NECK: Supple, No JVD  CHEST/LUNG: Clear to auscultation bilaterally; No rales, no rhonchi, no wheezing  HEART: Regular rate and rhythm; No murmurs, no rubs or gallops  ABDOMEN: Soft, Nontender, Nondistended; Bowel sounds present  GENITOURINARY- Voiding, no suprapubic tenderness  EXTREMITIES:  2+ Peripheral Pulses, No clubbing, cyanosis,   edema  MUSCULOSKELETAL:- No muscle tenderness, Muscle tone normal, No joint tenderness, no Joint swelling,  Joint ROM -normal  SKIN-no rash, no lesion    LABS: all reviewed                         12.1   9.18  )-----------( 175      ( 10 Feb 2022 08:13 )             35.2     02-10    144  |  112<H>  |  9   ----------------------------<  102<H>  3.3<L>   |  27  |  1.75<H>    Ca    8.0<L>      10 Feb 2022 08:13  Mg     1.7     10    TPro  6.0  /  Alb  3.2<L>  /  TBili  0.7  /  DBili  0.2  /  AST  17  /  ALT  47  /  AlkPhos  40  02-10    PT/INR - ( 2022 18:30 )   PT: 10.7 sec;   INR: 0.92 ratio         PTT - ( 2022 18:30 )  PTT:28.7 sec      Urinalysis Basic - ( 2022 19:59 )    Color: Yellow / Appearance: Clear / S.010 / pH: x  Gluc: x / Ketone: Negative  / Bili: Negative / Urobili: Negative   Blood: x / Protein: See Note / Nitrite: Negative   Leuk Esterase: Small / RBC: 3-5 /HPF / WBC 6-10   Sq Epi: x / Non Sq Epi: Occasional / Bacteria: Occasional        CAPILLARY BLOOD GLUCOSE      POCT Blood Glucose.: 122 mg/dL (10 Feb 2022 00:37)  POCT Blood Glucose.: 106 mg/dL (2022 18:59)    ABG - ( 2022 20:18 )  pH, Arterial: 7.40  pH, Blood: x     /  pCO2: 42    /  pO2: 100   / HCO3: 26    / Base Excess: 1.0   /  SaO2: 98                  RECENT CULTURES:    RADIOLOGY & ADDITIONAL TESTS: all reviewed   EKG reviewed        Current medications:  acetaminophen     Tablet .. 650 milliGRAM(s) Oral every 6 hours PRN  ascorbic acid 500 milliGRAM(s) Oral daily  atorvastatin 40 milliGRAM(s) Oral at bedtime  cholecalciferol 4000 Unit(s) Oral daily  escitalopram 20 milliGRAM(s) Oral daily  folic acid 50 milliGRAM(s),dextrose 5% 500 milliLiter(s) 50 milliGRAM(s) IV Intermittent every 6 hours  LORazepam   Injectable   IV Push   LORazepam   Injectable 2 milliGRAM(s) IV Push every 2 hours PRN  LORazepam   Injectable 2 milliGRAM(s) IV Push every 1 hour PRN  LORazepam   Injectable 2 milliGRAM(s) IV Push every 4 hours  LORazepam   Injectable 1.5 milliGRAM(s) IV Push every 4 hours  losartan 100 milliGRAM(s) Oral daily  multivitamin 1 Tablet(s) Oral daily  ondansetron Injectable 4 milliGRAM(s) IV Push every 6 hours PRN  sodium chloride 0.9%. 1000 milliLiter(s) IV Continuous <Continuous>  thiamine IVPB 500 milliGRAM(s) IV Intermittent every 8 hours

## 2022-02-10 NOTE — CONSULT NOTE ADULT - SUBJECTIVE AND OBJECTIVE BOX
MEDICAL TOXICOLOGY CONSULT    HPI:  66 Yr female k/c ETOH Abuse, found unresponsive by  today, TAMIE was alert to pain in ER. During ED stay patient recovered consciousnes and now admits to drinking rubbing alcohol. Denies co-ingestions. ED team admitting for monitoring and rehabilitation. Consulting Tox for additional recommendations.       ONSET / TIME of exposure(s): N/A    QUANTITY of exposure(s): N/A    ROUTE of exposure: Ingestion    CONTEXT of exposure: Home     ASSOCIATED symptoms: Unresponsiveness    PAST MEDICAL & SURGICAL HISTORY:  Hyperlipemia  Essential hypertension  Torn meniscus  Anxiety with depression   delivery delivered   &amp;   Status post lateral meniscus repair    Status post tendon repair  Left Foot as a teenager  S/P LASIK surgery of both eyes    History of arthroscopy of left knee  2017    REVIEW OF SYSTEMS:   Negative except HPI abovr    Vital Signs Last 24 Hrs  T(C): 36.9 (2022 23:18), Max: 36.9 (2022 23:18)  T(F): 98.4 (2022 23:18), Max: 98.4 (2022 23:18)  HR: 81 (10 Feb 2022 00:28) (60 - 81)  BP: 137/65 (10 Feb 2022 00:28) (116/61 - 148/79)  BP(mean): 85 (10 Feb 2022 00:28) (85 - 99)  RR: 20 (10 Feb 2022 00:28) (15 - 20)  SpO2: 98% (10 Feb 2022 00:28) (93% - 99%)    SIGNIFICANT LABORATORY STUDIES:                        13.2   7.53  )-----------( 186      ( 2022 18:30 )             38.9     02-09    145  |  111<H>  |  19  ----------------------------<  101<H>  4.0   |  28  |  0.94    Ca    8.9      2022 18:30  Mg     2.1     02-09    TPro  6.9  /  Alb  3.7  /  TBili  0.4  /  DBili  x   /  AST  36  /  ALT  64  /  AlkPhos  48  02-09    PT/INR - ( 2022 18:30 )   PT: 10.7 sec;   INR: 0.92 ratio    PTT - ( 2022 18:30 )  PTT:28.7 sec    Urinalysis Basic - ( 2022 19:59 )    Color: Yellow / Appearance: Clear / S.010 / pH: x  Gluc: x / Ketone: Negative  / Bili: Negative / Urobili: Negative   Blood: x / Protein: See Note / Nitrite: Negative   Leuk Esterase: Small / RBC: 3-5 /HPF / WBC 6-10   Sq Epi: x / Non Sq Epi: Occasional / Bacteria: Occasional        Anion Gap: 6  @ 18:30  Aspirin Level: <1.7<L>   @ 18:30  Acetaminophen Level:  <2<L>   @ 18:30

## 2022-02-10 NOTE — PATIENT PROFILE ADULT - INTERNATIONAL TRAVEL
56 y/o  female with diverticulosis and mild gastritis on endoscopic studies recently came for routine follow up. RUQ U/S was negative for gallstones,normal pancreas and normal right kidney.She dont have any polyps on colonoscopy. Given tiny ulcers on EGD i prescribed PPI daily x6-8 weeks and she is taking it.Most recently she have left CVA tenderness and they told her she had left kidney problem and CT was requested.She denies hematuria.No smoke. No

## 2022-02-10 NOTE — H&P ADULT - NEUROLOGICAL DETAILS
Mental status improving. Initially response only to noxious stimuli./responds to pain/responds to verbal commands

## 2022-02-10 NOTE — PROGRESS NOTE ADULT - ASSESSMENT
65 y/o F PMHx significant for Hypertension, Hyperlipidemia, and alcohol abuse was BIBA from home for further evaluation and management of altered mental status. As per EMS the patient was found unresponsive at home by the patient's . Of note the patient attempted to stop drinking 2 days ago, but started drinking again today. Upon arrival to triage the patient was only arousable to noxious stimuli. HPI obtained from ED staff, EMS, and patient's  due to the patient's mental status. In the ED Toxicology was consulted. As the patient's mental status improved she admitted to ingesting "rubbing alcohol".  Vital Signs in Triage => /68, HR 77/min, RR 18/min, TMax 97.7'F, SaO2 98%.    #Acute Isopropyl Alcohol Toxicity  #Ethyl Alcohol Withdrawal  ~admit to Telemetry  ~f/u w/ Toxicology  ~trend osmolar gap as isopropyl ingestions generally are elevated with normal anion gap  ~f/u repeat VBG  ~f/u Serum Osm, ETOH level, BMP, Serum Glucose and a serum acetone level.   ~as per Toxicology treatment is supportive as Isopropyl alcohol is metabolized to acetone via Alcohol Dehydrogenase enzyme pathway  ~no need for Fomepizole  ~per Toxicology administer Thiamine 500mg IV q8h + Folic acid 1mg/kg IV q6h  ~f/u am labs  ~cont. CIWA protocol    #Hypertension  ~cont. Losartan 100mg po daily    #Hyperlipidemia  ~cont. Atorvastatin 40mg po qhs    #Depression  ~cont. Escitalopram 20mg po daily    #Vte ppx  ~IMPROVE Vte Risk Score is 1  ~cont. SCDs for now 67 y/o F PMHx significant for Hypertension, Hyperlipidemia, and alcohol abuse was BIBA from home for further evaluation and management of altered mental status. As per EMS the patient was found unresponsive at home by the patient's . Of note the patient attempted to stop drinking 2 days ago, but started drinking again today. Upon arrival to triage the patient was only arousable to noxious stimuli. HPI obtained from ED staff, EMS, and patient's  due to the patient's mental status. In the ED Toxicology was consulted. As the patient's mental status improved she admitted to ingesting "rubbing alcohol".  Vital Signs in Triage => /68, HR 77/min, RR 18/min, TMax 97.7'F, SaO2 98%.    #Acute Isopropyl Alcohol Toxicity  #Ethyl Alcohol Withdrawal  ~admit to Telemetry  ~f/u w/ Toxicology  ~trend osmolar gap >30  ~f/u repeat VBG  ~f/u Serum Osm, ETOH level, BMP, Serum Glucose and a serum acetone level.   ~as per Toxicology treatment is supportive as Isopropyl alcohol is metabolized to acetone via Alcohol Dehydrogenase enzyme pathway  ~no need for Fomepizole  ~per Toxicology administer Thiamine 500mg IV q8h + Folic acid 1mg/kg IV q6h  ~f/u am labs  ~cont. CIWA protocol    #Hypertension  ~cont. Losartan 100mg po daily    #Hyperlipidemia  ~cont. Atorvastatin 40mg po qhs    #Depression  ~cont. Escitalopram 20mg po daily    #Vte ppx  ~IMPROVE Vte Risk Score is 1  ~cont. SCDs for now

## 2022-02-10 NOTE — H&P ADULT - HISTORY OF PRESENT ILLNESS
65 y/o F PMHx significant for Hypertension, Hyperlipidemia, and alcohol abuse was BIBA from home for further evaluation and management of altered mental status. As per EMS the patient was found unresponsive at home by the patient's . Of note the patient attempted to stop drinking 2 days ago, but started drinking again today. Upon arrival to triage the patient was only arousable to noxious stimuli. HPI obtained from ED staff, EMS, and patient's  due to the patient's mental status. In the ED Toxicology was consulted. As the patient's mental status improved she admitted to ingesting "rubbing alcohol".  Vital Signs in Triage => /68, HR 77/min, RR 18/min, TMax 97.7'F, SaO2 98%.

## 2022-02-10 NOTE — PHARMACOTHERAPY INTERVENTION NOTE - COMMENTS
Medication reconciliation completed.  Reviewed Medication list and confirmed med allergies with patient; confirmed with Dr. Zimmerman [facility].  Pt confirms that she did not take medication in the morning.  Pt was prescribed antabuse in January but did not .

## 2022-02-11 LAB
ACETONE SERPL-MCNC: NEGATIVE — SIGNIFICANT CHANGE UP
ANION GAP SERPL CALC-SCNC: 1 MMOL/L — LOW (ref 5–17)
ANION GAP SERPL CALC-SCNC: 4 MMOL/L — LOW (ref 5–17)
APPEARANCE UR: CLEAR — SIGNIFICANT CHANGE UP
BILIRUB UR-MCNC: NEGATIVE — SIGNIFICANT CHANGE UP
BUN SERPL-MCNC: 3 MG/DL — LOW (ref 7–23)
BUN SERPL-MCNC: 3 MG/DL — LOW (ref 7–23)
CALCIUM SERPL-MCNC: 8.6 MG/DL — SIGNIFICANT CHANGE UP (ref 8.5–10.1)
CALCIUM SERPL-MCNC: 8.6 MG/DL — SIGNIFICANT CHANGE UP (ref 8.5–10.1)
CHLORIDE SERPL-SCNC: 107 MMOL/L — SIGNIFICANT CHANGE UP (ref 96–108)
CHLORIDE SERPL-SCNC: 108 MMOL/L — SIGNIFICANT CHANGE UP (ref 96–108)
CO2 SERPL-SCNC: 28 MMOL/L — SIGNIFICANT CHANGE UP (ref 22–31)
CO2 SERPL-SCNC: 29 MMOL/L — SIGNIFICANT CHANGE UP (ref 22–31)
COLOR SPEC: YELLOW — SIGNIFICANT CHANGE UP
CREAT SERPL-MCNC: 1.12 MG/DL — SIGNIFICANT CHANGE UP (ref 0.5–1.3)
CREAT SERPL-MCNC: 1.46 MG/DL — HIGH (ref 0.5–1.3)
DIFF PNL FLD: NEGATIVE — SIGNIFICANT CHANGE UP
GLUCOSE SERPL-MCNC: 101 MG/DL — HIGH (ref 70–99)
GLUCOSE SERPL-MCNC: 198 MG/DL — HIGH (ref 70–99)
GLUCOSE UR QL: NEGATIVE — SIGNIFICANT CHANGE UP
HCT VFR BLD CALC: 35.6 % — SIGNIFICANT CHANGE UP (ref 34.5–45)
HGB BLD-MCNC: 11.8 G/DL — SIGNIFICANT CHANGE UP (ref 11.5–15.5)
KETONES UR-MCNC: ABNORMAL
LEUKOCYTE ESTERASE UR-ACNC: NEGATIVE — SIGNIFICANT CHANGE UP
MCHC RBC-ENTMCNC: 32.2 PG — SIGNIFICANT CHANGE UP (ref 27–34)
MCHC RBC-ENTMCNC: 33.1 GM/DL — SIGNIFICANT CHANGE UP (ref 32–36)
MCV RBC AUTO: 97 FL — SIGNIFICANT CHANGE UP (ref 80–100)
NITRITE UR-MCNC: NEGATIVE — SIGNIFICANT CHANGE UP
OSMOLALITY SERPL: 297 MOSMOL/KG — SIGNIFICANT CHANGE UP (ref 280–301)
PH UR: 8 — SIGNIFICANT CHANGE UP (ref 5–8)
PLATELET # BLD AUTO: 162 K/UL — SIGNIFICANT CHANGE UP (ref 150–400)
POTASSIUM SERPL-MCNC: 3.6 MMOL/L — SIGNIFICANT CHANGE UP (ref 3.5–5.3)
POTASSIUM SERPL-MCNC: 3.9 MMOL/L — SIGNIFICANT CHANGE UP (ref 3.5–5.3)
POTASSIUM SERPL-SCNC: 3.6 MMOL/L — SIGNIFICANT CHANGE UP (ref 3.5–5.3)
POTASSIUM SERPL-SCNC: 3.9 MMOL/L — SIGNIFICANT CHANGE UP (ref 3.5–5.3)
PROT UR-MCNC: SIGNIFICANT CHANGE UP
RBC # BLD: 3.67 M/UL — LOW (ref 3.8–5.2)
RBC # FLD: 12.8 % — SIGNIFICANT CHANGE UP (ref 10.3–14.5)
SODIUM SERPL-SCNC: 137 MMOL/L — SIGNIFICANT CHANGE UP (ref 135–145)
SODIUM SERPL-SCNC: 140 MMOL/L — SIGNIFICANT CHANGE UP (ref 135–145)
SP GR SPEC: 1.01 — SIGNIFICANT CHANGE UP (ref 1.01–1.02)
UROBILINOGEN FLD QL: NEGATIVE — SIGNIFICANT CHANGE UP
WBC # BLD: 5.78 K/UL — SIGNIFICANT CHANGE UP (ref 3.8–10.5)
WBC # FLD AUTO: 5.78 K/UL — SIGNIFICANT CHANGE UP (ref 3.8–10.5)

## 2022-02-11 PROCEDURE — 99232 SBSQ HOSP IP/OBS MODERATE 35: CPT

## 2022-02-11 RX ORDER — SODIUM,POTASSIUM PHOSPHATES 278-250MG
2 POWDER IN PACKET (EA) ORAL ONCE
Refills: 0 | Status: COMPLETED | OUTPATIENT
Start: 2022-02-11 | End: 2022-02-11

## 2022-02-11 RX ADMIN — Medication 255 MILLIGRAM(S): at 12:45

## 2022-02-11 RX ADMIN — Medication 650 MILLIGRAM(S): at 14:55

## 2022-02-11 RX ADMIN — Medication 255 MILLIGRAM(S): at 22:38

## 2022-02-11 RX ADMIN — Medication 1 TABLET(S): at 10:26

## 2022-02-11 RX ADMIN — Medication 1.5 MILLIGRAM(S): at 18:26

## 2022-02-11 RX ADMIN — ATORVASTATIN CALCIUM 40 MILLIGRAM(S): 80 TABLET, FILM COATED ORAL at 22:37

## 2022-02-11 RX ADMIN — LOSARTAN POTASSIUM 100 MILLIGRAM(S): 100 TABLET, FILM COATED ORAL at 10:26

## 2022-02-11 RX ADMIN — Medication 1 MILLIGRAM(S): at 22:37

## 2022-02-11 RX ADMIN — Medication 500 MILLIGRAM(S): at 10:25

## 2022-02-11 RX ADMIN — Medication 2 PACKET(S): at 17:34

## 2022-02-11 RX ADMIN — Medication 1.5 MILLIGRAM(S): at 10:44

## 2022-02-11 RX ADMIN — Medication 1.5 MILLIGRAM(S): at 02:12

## 2022-02-11 RX ADMIN — Medication 255 MILLIGRAM(S): at 06:08

## 2022-02-11 RX ADMIN — ESCITALOPRAM OXALATE 20 MILLIGRAM(S): 10 TABLET, FILM COATED ORAL at 10:34

## 2022-02-11 RX ADMIN — Medication 40 MILLIEQUIVALENT(S): at 10:26

## 2022-02-11 RX ADMIN — Medication 4000 UNIT(S): at 10:25

## 2022-02-11 RX ADMIN — Medication 1.5 MILLIGRAM(S): at 14:55

## 2022-02-11 RX ADMIN — Medication 1.5 MILLIGRAM(S): at 06:08

## 2022-02-11 NOTE — PROGRESS NOTE ADULT - SUBJECTIVE AND OBJECTIVE BOX
Subjective:  Patient is a 66y old  Female who presents with a chief complaint of Altered Mental Status (10 Feb 2022 01:46)    HPI:  65 y/o F PMHx significant for Hypertension, Hyperlipidemia, and alcohol abuse was BIBA from home for further evaluation and management of altered mental status. As per EMS the patient was found unresponsive at home by the patient's . Of note the patient attempted to stop drinking 2 days ago, but started drinking again today. Upon arrival to triage the patient was only arousable to noxious stimuli. HPI obtained from ED staff, EMS, and patient's  due to the patient's mental status. In the ED Toxicology was consulted. As the patient's mental status improved she admitted to ingesting "rubbing alcohol".  Vital Signs in Triage => /68, HR 77/min, RR 18/min, TMax 97.7'F, SaO2 98%.   (10 Feb 2022 00:38)      Patient seen and examined at bedside earlier today,  (+) headache         OBJECTIVE:   Vital Signs Last 24 Hrs  T(C): 36.6 (2022 15:04), Max: 36.9 (10 Feb 2022 18:06)  T(F): 97.9 (2022 15:04), Max: 98.5 (10 Feb 2022 18:06)  HR: 65 (2022 15:04) (63 - 76)  BP: 124/61 (2022 15:04) (124/61 - 152/80)  BP(mean): 78 (2022 15:04) (78 - 86)  RR: 18 (2022 15:04) (17 - 18)  SpO2: 97% (2022 15:04) (94% - 100%)  PHYSICAL EXAM:  GENERAL: NAD  HEAD:  Atraumatic, Normocephalic  EYES: EOMI, PERRLA, conjunctiva and sclera clear  HEENT: Moist mucous membranes  NECK: Supple, No JVD  CHEST/LUNG: Clear to auscultation bilaterally; No rales, no rhonchi, no wheezing  HEART: Regular rate and rhythm; No murmurs, no rubs or gallops  ABDOMEN: Soft, Nontender, Nondistended; Bowel sounds present  GENITOURINARY- Voiding, no suprapubic tenderness  EXTREMITIES:  2+ Peripheral Pulses, No clubbing, cyanosis,   edema  MUSCULOSKELETAL:- No muscle tenderness, Muscle tone normal, No joint tenderness, no Joint swelling,  Joint ROM -normal  NERVOUS SYSTEM:  Alert & Oriented X3, non- focal exam,  Motor Strength 5/5 B/L upper and lower extremities; DTRs 2+ intact and symmetric  SKIN-no rash, no lesion      137  |  107  |  3<L>  ----------------------------<  101<H>  3.9   |  29  |  1.12    Ca    8.6      2022 16:08  Phos  1.9     11  Mg     1.7     10    TPro  6.0  /  Alb  3.2<L>  /  TBili  0.7  /  DBili  0.2  /  AST  17  /  ALT  47  /  AlkPhos  40  0210                          11.8   5.78  )-----------( 162      ( 2022 08:13 )             35.6       LIVER FUNCTIONS - ( 10 Feb 2022 08:13 )  Alb: 3.2 g/dL / Pro: 6.0 gm/dL / ALK PHOS: 40 U/L / ALT: 47 U/L / AST: 17 U/L / GGT: x             PT/INR - ( 2022 18:30 )   PT: 10.7 sec;   INR: 0.92 ratio         PTT - ( 2022 18:30 )  PTT:28.7 sec    Urinalysis Basic - ( 2022 19:59 )    Color: Yellow / Appearance: Clear / S.010 / pH: x  Gluc: x / Ketone: Negative  / Bili: Negative / Urobili: Negative   Blood: x / Protein: See Note / Nitrite: Negative   Leuk Esterase: Small / RBC: 3-5 /HPF / WBC 6-10   Sq Epi: x / Non Sq Epi: Occasional / Bacteria: Occasional      CAPILLARY BLOOD GLUCOSE      POCT Blood Glucose.: 122 mg/dL (10 Feb 2022 00:37)  POCT Blood Glucose.: 106 mg/dL (2022 18:59)    ABG - ( 2022 20:18 )  pH, Arterial: 7.40  pH, Blood: x     /  pCO2: 42    /  pO2: 100   / HCO3: 26    / Base Excess: 1.0   /  SaO2: 98          RECENT CULTURES:    RADIOLOGY & ADDITIONAL TESTS: all reviewed   EKG reviewed      MEDICATIONS  (STANDING):  ascorbic acid 500 milliGRAM(s) Oral daily  atorvastatin 40 milliGRAM(s) Oral at bedtime  cholecalciferol 4000 Unit(s) Oral daily  escitalopram 20 milliGRAM(s) Oral daily  folic acid 50 milliGRAM(s),dextrose 5% 500 milliLiter(s) 50 milliGRAM(s) IV Intermittent every 6 hours  LORazepam   Injectable   IV Push   LORazepam   Injectable 1.5 milliGRAM(s) IV Push every 4 hours  LORazepam   Injectable 1 milliGRAM(s) IV Push every 4 hours  losartan 100 milliGRAM(s) Oral daily  multivitamin 1 Tablet(s) Oral daily  potassium chloride    Tablet ER 40 milliEquivalent(s) Oral daily  potassium phosphate / sodium phosphate Powder (PHOS-NaK) 2 Packet(s) Oral once  sodium chloride 0.9%. 1000 milliLiter(s) (75 mL/Hr) IV Continuous <Continuous>  thiamine IVPB 500 milliGRAM(s) IV Intermittent every 8 hours    MEDICATIONS  (PRN):  acetaminophen     Tablet .. 650 milliGRAM(s) Oral every 6 hours PRN Temp greater or equal to 38C (100.4F), Mild Pain (1 - 3)  LORazepam   Injectable 2 milliGRAM(s) IV Push every 2 hours PRN Symptom-triggered: 2 point increase in CIWA -Ar score and a total score of 7 or LESS  LORazepam   Injectable 2 milliGRAM(s) IV Push every 1 hour PRN Symptom-triggered: each CIWA -Ar score 8 or GREATER  ondansetron Injectable 4 milliGRAM(s) IV Push every 6 hours PRN Nausea         Subjective:  Patient is a 66y old  Female who presents with a chief complaint of Altered Mental Status (10 Feb 2022 01:46)    HPI:  67 y/o F PMHx significant for Hypertension, Hyperlipidemia, and alcohol abuse was BIBA from home for further evaluation and management of altered mental status. As per EMS the patient was found unresponsive at home by the patient's . Of note the patient attempted to stop drinking 2 days ago, but started drinking again today. Upon arrival to triage the patient was only arousable to noxious stimuli. HPI obtained from ED staff, EMS, and patient's  due to the patient's mental status. In the ED Toxicology was consulted. As the patient's mental status improved she admitted to ingesting "rubbing alcohol".  Vital Signs in Triage => /68, HR 77/min, RR 18/min, TMax 97.7'F, SaO2 98%.   (10 Feb 2022 00:38)      Patient seen and examined at bedside earlier today,    headache improved   Afebrile   OBJECTIVE:   Vital Signs Last 24 Hrs  T(C): 36.6 (2022 15:04), Max: 36.9 (10 Feb 2022 18:06)  T(F): 97.9 (2022 15:04), Max: 98.5 (10 Feb 2022 18:06)  HR: 65 (2022 15:04) (63 - 76)  BP: 124/61 (2022 15:04) (124/61 - 152/80)  BP(mean): 78 (2022 15:04) (78 - 86)  RR: 18 (2022 15:04) (17 - 18)  SpO2: 97% (2022 15:04) (94% - 100%)  PHYSICAL EXAM:  GENERAL: NAD  HEAD:  Atraumatic, Normocephalic  EYES: EOMI, PERRLA, conjunctiva and sclera clear  HEENT: Moist mucous membranes  NECK: Supple, No JVD  CHEST/LUNG: Clear to auscultation bilaterally; No rales, no rhonchi, no wheezing  HEART: Regular rate and rhythm; No murmurs, no rubs or gallops  ABDOMEN: Soft, Nontender, Nondistended; Bowel sounds present  GENITOURINARY- Voiding, no suprapubic tenderness  EXTREMITIES:  2+ Peripheral Pulses, No clubbing, cyanosis,   edema  MUSCULOSKELETAL:- No muscle tenderness, Muscle tone normal, No joint tenderness, no Joint swelling,  Joint ROM -normal  NERVOUS SYSTEM:  Alert & Oriented X3, non- focal exam,  Motor Strength 5/5 B/L upper and lower extremities; DTRs 2+ intact and symmetric  SKIN-no rash, no lesion      137  |  107  |  3<L>  ----------------------------<  101<H>  3.9   |  29  |  1.12    Ca    8.6      2022 16:08  Phos  1.9     11  Mg     1.7     10    TPro  6.0  /  Alb  3.2<L>  /  TBili  0.7  /  DBili  0.2  /  AST  17  /  ALT  47  /  AlkPhos  40  0210                          11.8   5.78  )-----------( 162      ( 2022 08:13 )             35.6       LIVER FUNCTIONS - ( 10 Feb 2022 08:13 )  Alb: 3.2 g/dL / Pro: 6.0 gm/dL / ALK PHOS: 40 U/L / ALT: 47 U/L / AST: 17 U/L / GGT: x             PT/INR - ( 2022 18:30 )   PT: 10.7 sec;   INR: 0.92 ratio         PTT - ( 2022 18:30 )  PTT:28.7 sec    Urinalysis Basic - ( 2022 19:59 )    Color: Yellow / Appearance: Clear / S.010 / pH: x  Gluc: x / Ketone: Negative  / Bili: Negative / Urobili: Negative   Blood: x / Protein: See Note / Nitrite: Negative   Leuk Esterase: Small / RBC: 3-5 /HPF / WBC 6-10   Sq Epi: x / Non Sq Epi: Occasional / Bacteria: Occasional      CAPILLARY BLOOD GLUCOSE      POCT Blood Glucose.: 122 mg/dL (10 Feb 2022 00:37)  POCT Blood Glucose.: 106 mg/dL (2022 18:59)    ABG - ( 2022 20:18 )  pH, Arterial: 7.40  pH, Blood: x     /  pCO2: 42    /  pO2: 100   / HCO3: 26    / Base Excess: 1.0   /  SaO2: 98          RECENT CULTURES:    RADIOLOGY & ADDITIONAL TESTS: all reviewed   EKG reviewed      MEDICATIONS  (STANDING):  ascorbic acid 500 milliGRAM(s) Oral daily  atorvastatin 40 milliGRAM(s) Oral at bedtime  cholecalciferol 4000 Unit(s) Oral daily  escitalopram 20 milliGRAM(s) Oral daily  folic acid 50 milliGRAM(s),dextrose 5% 500 milliLiter(s) 50 milliGRAM(s) IV Intermittent every 6 hours  LORazepam   Injectable   IV Push   LORazepam   Injectable 1.5 milliGRAM(s) IV Push every 4 hours  LORazepam   Injectable 1 milliGRAM(s) IV Push every 4 hours  losartan 100 milliGRAM(s) Oral daily  multivitamin 1 Tablet(s) Oral daily  potassium chloride    Tablet ER 40 milliEquivalent(s) Oral daily  potassium phosphate / sodium phosphate Powder (PHOS-NaK) 2 Packet(s) Oral once  sodium chloride 0.9%. 1000 milliLiter(s) (75 mL/Hr) IV Continuous <Continuous>  thiamine IVPB 500 milliGRAM(s) IV Intermittent every 8 hours    MEDICATIONS  (PRN):  acetaminophen     Tablet .. 650 milliGRAM(s) Oral every 6 hours PRN Temp greater or equal to 38C (100.4F), Mild Pain (1 - 3)  LORazepam   Injectable 2 milliGRAM(s) IV Push every 2 hours PRN Symptom-triggered: 2 point increase in CIWA -Ar score and a total score of 7 or LESS  LORazepam   Injectable 2 milliGRAM(s) IV Push every 1 hour PRN Symptom-triggered: each CIWA -Ar score 8 or GREATER  ondansetron Injectable 4 milliGRAM(s) IV Push every 6 hours PRN Nausea

## 2022-02-11 NOTE — PROGRESS NOTE ADULT - ASSESSMENT
65 y/o F PMHx significant for Hypertension, Hyperlipidemia, and alcohol abuse was BIBA from home for further evaluation and management of altered mental status. As per EMS the patient was found unresponsive at home by the patient's . Of note the patient attempted to stop drinking 2 days ago, but started drinking again today. Upon arrival to triage the patient was only arousable to noxious stimuli. HPI obtained from ED staff, EMS, and patient's  due to the patient's mental status. In the ED Toxicology was consulted. As the patient's mental status improved she admitted to ingesting "rubbing alcohol".  Vital Signs in Triage => /68, HR 77/min, RR 18/min, TMax 97.7'F, SaO2 98%.    #Acute Isopropyl Alcohol Toxicity  #Ethyl Alcohol Withdrawal  ~admit to Telemetry  ~f/u w/ Toxicology  ~trend osmolar gap >30  ~f/u repeat VBG  ~f/u Serum Osm, ETOH level, BMP, Serum Glucose and a serum acetone level.   ~as per Toxicology treatment is supportive as Isopropyl alcohol is metabolized to acetone via Alcohol Dehydrogenase enzyme pathway  ~no need for Fomepizole  ~per Toxicology administer Thiamine 500mg IV q8h + Folic acid 1mg/kg IV q6h  ~f/u am labs  ~cont. CIWA protocol    #Hypertension  ~cont. Losartan 100mg po daily    #Hyperlipidemia  ~cont. Atorvastatin 40mg po qhs    #Depression  ~cont. Escitalopram 20mg po daily    #Vte ppx  ~IMPROVE Vte Risk Score is 1  ~cont. SCDs for now 65 y/o F PMHx significant for Hypertension, Hyperlipidemia, and alcohol abuse was BIBA from home for further evaluation and management of altered mental status. As per EMS the patient was found unresponsive at home by the patient's . Of note the patient attempted to stop drinking 2 days ago, but started drinking again today. Upon arrival to triage the patient was only arousable to noxious stimuli. HPI obtained from ED staff, EMS, and patient's  due to the patient's mental status. In the ED Toxicology was consulted. As the patient's mental status improved she admitted to ingesting "rubbing alcohol".  Vital Signs in Triage => /68, HR 77/min, RR 18/min, TMax 97.7'F, SaO2 98%.    #Acute Isopropyl Alcohol Toxicity  #Ethyl Alcohol Withdrawal  ~admit to Telemetry  ~f/u w/ Toxicology  ~trend osmolar gap >30  ~f/u repeat VBG  ~f/u Serum Osm, ETOH level, BMP, Serum Glucose and a serum acetone level.   ~as per Toxicology treatment is supportive as Isopropyl alcohol is metabolized to acetone via Alcohol Dehydrogenase enzyme pathway  ~no need for Fomepizole  ~per Toxicology administer Thiamine 500mg IV q8h + Folic acid 1mg/kg IV q6h  ~f/u am labs  ~cont. CIWA protocol    # ALEJANDRO improving  1.75  ->1.12  after fluids   monitor creatinine       #Hypertension  ~cont. Losartan 100mg po daily    #Hyperlipidemia  ~cont. Atorvastatin 40mg po qhs    #Depression  ~cont. Escitalopram 20mg po daily    #Vte ppx  ~IMPROVE Vte Risk Score is 1  ~cont. SCDs for now  Likely Dc tomorrow  65 y/o F PMHx significant for Hypertension, Hyperlipidemia, and alcohol abuse was BIBA from home for further evaluation and management of altered mental status. As per EMS the patient was found unresponsive at home by the patient's . Of note the patient attempted to stop drinking 2 days ago, but started drinking again today. Upon arrival to triage the patient was only arousable to noxious stimuli. HPI obtained from ED staff, EMS, and patient's  due to the patient's mental status. In the ED Toxicology was consulted. As the patient's mental status improved she admitted to ingesting "rubbing alcohol".  Vital Signs in Triage => /68, HR 77/min, RR 18/min, TMax 97.7'F, SaO2 98%.    #Acute Isopropyl Alcohol Toxicity  #Ethyl Alcohol Withdrawal  ~admit to Telemetry  ~f/u w/ Toxicology  ~trend osmolar gap >30  ~f/u repeat VBG  ~f/u Serum Osm, ETOH level, BMP, Serum Glucose and a serum acetone level.   ~as per Toxicology treatment is supportive as Isopropyl alcohol is metabolized to acetone via Alcohol Dehydrogenase enzyme pathway  ~no need for Fomepizole  ~per Toxicology administer Thiamine 500mg IV q8h + Folic acid 1mg/kg IV q6h  ~f/u am labs  ~cont. CIWA protocol      # Toxic Metabolic encephalopathy   - Improved     # ALEJANDRO improving  1.75  ->1.12  after fluids   monitor creatinine       #Hypertension  ~cont. Losartan 100mg po daily    #Hyperlipidemia  ~cont. Atorvastatin 40mg po qhs    #Depression  ~cont. Escitalopram 20mg po daily    #Vte ppx  ~IMPROVE Vte Risk Score is 1  ~cont. SCDs for now  Likely Dc tomorrow

## 2022-02-11 NOTE — PROGRESS NOTE ADULT - SUBJECTIVE AND OBJECTIVE BOX
NEPHROLOGY CONSULT  HPI:  67 y/o F PMHx significant for Hypertension, Hyperlipidemia, and alcohol abuse was BIBA from home for further evaluation and management of altered mental status. As per EMS the patient was found unresponsive at home by the patient's . Of note the patient attempted to stop drinking 2 days ago, but started drinking again today. Upon arrival to triage the patient was only arousable to noxious stimuli. HPI obtained from ED staff, EMS, and patient's  due to the patient's mental status. In the ED Toxicology was consulted. As the patient's mental status improved she admitted to ingesting "rubbing alcohol".  Vital Signs in Triage => /68, HR 77/min, RR 18/min, TMax 97.7'F, SaO2 98%.    Above Obtained from chart:  Pt interviewed chart reviewed  Poison control was contacted and recommendations were followed.  Pt is now on IVF.  Awake alert, was obtunded on admission, and states drank ~ 4 oz rubbing alcohol  States has done this once before early covid due to isolation and stress.  On 20 creat was 2.13, but no documentation was available  Now they do not keep alcohol in the house and needed / craved alcohol and went for the rubbing alcohol.  "Social drinker" until recent years ( she states 2 glasses of wine with dinner daily)  Retired   Creat 1.74 improving          PAST MEDICAL & SURGICAL HISTORY:  Hyperlipemia    Essential hypertension    Torn meniscus    Anxiety with depression     delivery delivered   &amp;     Status post lateral meniscus repair      Status post tendon repair  Left Foot as a teenager    S/P LASIK surgery of both eyes  2013    History of arthroscopy of left knee  2017        FAMILY HISTORY:  Family history of heart disease (Father)  Father        MEDICATIONS  (STANDING):  ascorbic acid 500 milliGRAM(s) Oral daily  atorvastatin 40 milliGRAM(s) Oral at bedtime  cholecalciferol 4000 Unit(s) Oral daily  escitalopram 20 milliGRAM(s) Oral daily  folic acid 50 milliGRAM(s),dextrose 5% 500 milliLiter(s) 50 milliGRAM(s) IV Intermittent every 6 hours  LORazepam   Injectable   IV Push   LORazepam   Injectable 1.5 milliGRAM(s) IV Push every 4 hours  LORazepam   Injectable 1 milliGRAM(s) IV Push every 4 hours  losartan 100 milliGRAM(s) Oral daily  multivitamin 1 Tablet(s) Oral daily  potassium chloride    Tablet ER 40 milliEquivalent(s) Oral daily  sodium chloride 0.9%. 1000 milliLiter(s) (75 mL/Hr) IV Continuous <Continuous>  thiamine IVPB 500 milliGRAM(s) IV Intermittent every 8 hours    MEDICATIONS  (PRN):  acetaminophen     Tablet .. 650 milliGRAM(s) Oral every 6 hours PRN Temp greater or equal to 38C (100.4F), Mild Pain (1 - 3)  LORazepam   Injectable 2 milliGRAM(s) IV Push every 2 hours PRN Symptom-triggered: 2 point increase in CIWA -Ar score and a total score of 7 or LESS  LORazepam   Injectable 2 milliGRAM(s) IV Push every 1 hour PRN Symptom-triggered: each CIWA -Ar score 8 or GREATER  ondansetron Injectable 4 milliGRAM(s) IV Push every 6 hours PRN Nausea      Allergies    No Known Allergies    Intolerances        I&O's Summary    2022 07:01  -  2022 13:26  --------------------------------------------------------  IN: 615 mL / OUT: 0 mL / NET: 615 mL          REVIEW OF SYSTEMS:    CONSTITUTIONAL:  As per HPI.  CONSTITUTIONAL: No weakness, fevers or chills  EYES/ENT: No visual changes;  No vertigo or throat pain   NECK: No pain or stiffness  CARDIOVASCULAR: No chest pain or palpitations  GASTROINTESTINAL: No abdominal or epigastric pain. No nausea, vomiting, or hematemesis; No diarrhea or constipation. No melena or hematochezia.  GENITOURINARY: No dysuria, frequency or hematuria  NEUROLOGICAL: No numbness or weakness  SKIN: No itching, burning, rashes, or lesions   All other review of systems is negative unless indicated above      Vital Signs Last 24 Hrs  T(C): 36.9 (2022 09:11), Max: 36.9 (10 Feb 2022 15:34)  T(F): 98.4 (2022 09:11), Max: 98.5 (10 Feb 2022 18:06)  HR: 65 (2022 10:30) (63 - 84)  BP: 152/80 (2022 10:30) (120/67 - 152/80)  BP(mean): 86 (10 Feb 2022 18:06) (82 - 86)  RR: 18 (2022 10:30) (17 - 19)  SpO2: 98% (2022 10:30) (94% - 100%)        PHYSICAL EXAM:    General:  Alert, No acute distress.    Neuro:  Alert and oriented to person, place, and time. Able to communicate  well.      HEENT:  No JVD, no masses, Eyes anicteric, ,    Cardiovascular:  Regular rate and rhythm, with normal S1 and S2.    Lungs:  clear. no rales, no wheezing, .    Abdomen:  Normoactive bowel sounds. Soft, flat, non-tender, and non-distended.  positive bowel sounds    Skin:  Warm, dry    Extremities:  warm,  no cyanosis    LABS:                        11.8   5.78  )-----------( 162      ( 2022 08:13 )             35.6     02-11    140  |  108  |  3<L>  ----------------------------<  198<H>  3.6   |  28  |  1.46<H>    Ca    8.6      2022 08:13  Phos  1.9     02-11  Mg     1.7     02-10    TPro  6.0  /  Alb  3.2<L>  /  TBili  0.7  /  DBili  0.2  /  AST  17  /  ALT  47  /  AlkPhos  40  02-10    PT/INR - ( 2022 18:30 )   PT: 10.7 sec;   INR: 0.92 ratio         PTT - ( 2022 18:30 )  PTT:28.7 sec  Urinalysis Basic - ( 2022 19:59 )    Color: Yellow / Appearance: Clear / S.010 / pH: x  Gluc: x / Ketone: Negative  / Bili: Negative / Urobili: Negative   Blood: x / Protein: See Note / Nitrite: Negative   Leuk Esterase: Small / RBC: 3-5 /HPF / WBC 6-10   Sq Epi: x / Non Sq Epi: Occasional / Bacteria: Occasional      Phosphorus Level, Serum: 1.9 mg/dL ( @ 08:13)    ABG - ( 2022 20:18 )  pH, Arterial: 7.40  pH, Blood: x     /  pCO2: 42    /  pO2: 100   / HCO3: 26    / Base Excess: 1.0   /  SaO2: 98

## 2022-02-11 NOTE — PROGRESS NOTE ADULT - ASSESSMENT
65 y/o F PMHx significant for Hypertension, Hyperlipidemia, and alcohol abuse was BIBA from home for further evaluation and management of altered mental status. As per EMS the patient was found unresponsive at home by the patient's . Of note the patient attempted to stop drinking 2 days ago, but started drinking again today. Upon arrival to triage the patient was only arousable to noxious stimuli. HPI obtained from ED staff, EMS, and patient's  due to the patient's mental status. In the ED Toxicology was consulted. As the patient's mental status improved she admitted to ingesting "rubbing alcohol".  Vital Signs in Triage => /68, HR 77/min, RR 18/min, TMax 97.7'F, SaO2 98%.    Above Obtained from chart:  Pt interviewed chart reviewed  Poison control was contacted and recommendations were followed.  Pt is now on IVF.  Awake alert, was obtunded on admission, and states drank ~ 4 oz rubbing alcohol  States has done this once before early covid due to isolation and stress.  On 6/22/20 creat was 2.13, but no documentation was available  Now they do not keep alcohol in the house and needed / craved alcohol and went for the rubbing alcohol.  "Social drinker" until recent years ( she states 2 glasses of wine with dinner daily)  Retired   Creat 1.74 improving, Acetone may cause Jaffes reaction, interference with creat and false elevation with  colorimetric assay).  Recommendation is to evaluate creat with blood gas analyzer, which uses an enzymatic assay.  Checked with lab our ABG analyzer is not certified, will call Jasmin.  Admission labs osmolal gap 38  estimated isopropyl alcohol level was 228 mg/dl  Repeat labs this afternoon pending

## 2022-02-11 NOTE — CDI QUERY NOTE - NSCDIOTHERTXTBX_GEN_ALL_CORE_HH
CLINICAL DOCUMENTATION STATES:     Reason for Admission: Altered Mental Status  History of Present Illness:   65 y/o F PMHx significant for Hypertension, Hyperlipidemia, and alcohol abuse was BIBA from home for further evaluation and management of altered mental status. As per EMS the patient was found unresponsive at home by the patient's . Of note the patient attempted to stop drinking 2 days ago, but started drinking again today. Upon arrival to triage the patient was only arousable to noxious stimuli. HPI obtained from ED staff, EMS, and patient's  due to the patient's mental status. In the ED Toxicology was consulted. As the patient's mental status improved she admitted to ingesting "rubbing alcohol".    ALTERED MENTAL STATUS IS A NONSPECIFIC TERM.   IF CLINICALLY SIGNIFICANT PLEASE FURTHER CLARIFY THE DIAGNOSIS ASSOCIATED WITH ALTERED MENTAL STATUS IN THE MEDICAL RECORD:     A. TOXIC METABOLIC ENCEPHALOPATHY  B. METABOLIC ENCEPHALOPATHY  C. OTHER (SPECIFY)  D. NOT CLINICALLY SIGNIFICANT

## 2022-02-12 LAB
ALBUMIN SERPL ELPH-MCNC: 3 G/DL — LOW (ref 3.3–5)
ALP SERPL-CCNC: 35 U/L — LOW (ref 40–120)
ALT FLD-CCNC: 32 U/L — SIGNIFICANT CHANGE UP (ref 12–78)
ANION GAP SERPL CALC-SCNC: 5 MMOL/L — SIGNIFICANT CHANGE UP (ref 5–17)
AST SERPL-CCNC: 6 U/L — LOW (ref 15–37)
BILIRUB SERPL-MCNC: 0.8 MG/DL — SIGNIFICANT CHANGE UP (ref 0.2–1.2)
BUN SERPL-MCNC: 5 MG/DL — LOW (ref 7–23)
CALCIUM SERPL-MCNC: 9.3 MG/DL — SIGNIFICANT CHANGE UP (ref 8.5–10.1)
CHLORIDE SERPL-SCNC: 108 MMOL/L — SIGNIFICANT CHANGE UP (ref 96–108)
CO2 SERPL-SCNC: 26 MMOL/L — SIGNIFICANT CHANGE UP (ref 22–31)
CREAT SERPL-MCNC: 1.01 MG/DL — SIGNIFICANT CHANGE UP (ref 0.5–1.3)
GLUCOSE SERPL-MCNC: 123 MG/DL — HIGH (ref 70–99)
HCT VFR BLD CALC: 34.9 % — SIGNIFICANT CHANGE UP (ref 34.5–45)
HGB BLD-MCNC: 11.7 G/DL — SIGNIFICANT CHANGE UP (ref 11.5–15.5)
MAGNESIUM SERPL-MCNC: 1.7 MG/DL — SIGNIFICANT CHANGE UP (ref 1.6–2.6)
MCHC RBC-ENTMCNC: 32.2 PG — SIGNIFICANT CHANGE UP (ref 27–34)
MCHC RBC-ENTMCNC: 33.5 GM/DL — SIGNIFICANT CHANGE UP (ref 32–36)
MCV RBC AUTO: 96.1 FL — SIGNIFICANT CHANGE UP (ref 80–100)
PLATELET # BLD AUTO: 152 K/UL — SIGNIFICANT CHANGE UP (ref 150–400)
POTASSIUM SERPL-MCNC: 4 MMOL/L — SIGNIFICANT CHANGE UP (ref 3.5–5.3)
POTASSIUM SERPL-SCNC: 4 MMOL/L — SIGNIFICANT CHANGE UP (ref 3.5–5.3)
PROT SERPL-MCNC: 6.2 GM/DL — SIGNIFICANT CHANGE UP (ref 6–8.3)
RBC # BLD: 3.63 M/UL — LOW (ref 3.8–5.2)
RBC # FLD: 12.6 % — SIGNIFICANT CHANGE UP (ref 10.3–14.5)
SODIUM SERPL-SCNC: 139 MMOL/L — SIGNIFICANT CHANGE UP (ref 135–145)
VIT B12 SERPL-MCNC: 614 PG/ML — SIGNIFICANT CHANGE UP (ref 232–1245)
WBC # BLD: 5.38 K/UL — SIGNIFICANT CHANGE UP (ref 3.8–10.5)
WBC # FLD AUTO: 5.38 K/UL — SIGNIFICANT CHANGE UP (ref 3.8–10.5)

## 2022-02-12 PROCEDURE — 99232 SBSQ HOSP IP/OBS MODERATE 35: CPT

## 2022-02-12 RX ADMIN — Medication 4000 UNIT(S): at 09:58

## 2022-02-12 RX ADMIN — Medication 1 MILLIGRAM(S): at 03:26

## 2022-02-12 RX ADMIN — Medication 1 MILLIGRAM(S): at 09:59

## 2022-02-12 RX ADMIN — Medication 1 MILLIGRAM(S): at 05:48

## 2022-02-12 RX ADMIN — SODIUM CHLORIDE 75 MILLILITER(S): 9 INJECTION INTRAMUSCULAR; INTRAVENOUS; SUBCUTANEOUS at 12:49

## 2022-02-12 RX ADMIN — ATORVASTATIN CALCIUM 40 MILLIGRAM(S): 80 TABLET, FILM COATED ORAL at 22:07

## 2022-02-12 RX ADMIN — Medication 1 MILLIGRAM(S): at 19:47

## 2022-02-12 RX ADMIN — Medication 500 MILLIGRAM(S): at 09:58

## 2022-02-12 RX ADMIN — Medication 0.5 MILLIGRAM(S): at 22:08

## 2022-02-12 RX ADMIN — Medication 255 MILLIGRAM(S): at 22:07

## 2022-02-12 RX ADMIN — Medication 1 TABLET(S): at 09:59

## 2022-02-12 RX ADMIN — LOSARTAN POTASSIUM 100 MILLIGRAM(S): 100 TABLET, FILM COATED ORAL at 09:59

## 2022-02-12 RX ADMIN — Medication 255 MILLIGRAM(S): at 14:05

## 2022-02-12 RX ADMIN — ESCITALOPRAM OXALATE 20 MILLIGRAM(S): 10 TABLET, FILM COATED ORAL at 10:05

## 2022-02-12 RX ADMIN — Medication 1 MILLIGRAM(S): at 14:05

## 2022-02-12 RX ADMIN — Medication 40 MILLIEQUIVALENT(S): at 09:59

## 2022-02-12 RX ADMIN — Medication 255 MILLIGRAM(S): at 05:47

## 2022-02-13 ENCOUNTER — TRANSCRIPTION ENCOUNTER (OUTPATIENT)
Age: 67
End: 2022-02-13

## 2022-02-13 VITALS
OXYGEN SATURATION: 97 % | HEART RATE: 61 BPM | DIASTOLIC BLOOD PRESSURE: 74 MMHG | SYSTOLIC BLOOD PRESSURE: 135 MMHG | TEMPERATURE: 98 F | RESPIRATION RATE: 18 BRPM

## 2022-02-13 LAB
ALBUMIN SERPL ELPH-MCNC: 3 G/DL — LOW (ref 3.3–5)
ALP SERPL-CCNC: 36 U/L — LOW (ref 40–120)
ALT FLD-CCNC: 28 U/L — SIGNIFICANT CHANGE UP (ref 12–78)
ANION GAP SERPL CALC-SCNC: 6 MMOL/L — SIGNIFICANT CHANGE UP (ref 5–17)
AST SERPL-CCNC: 6 U/L — LOW (ref 15–37)
BILIRUB SERPL-MCNC: 0.7 MG/DL — SIGNIFICANT CHANGE UP (ref 0.2–1.2)
BUN SERPL-MCNC: 8 MG/DL — SIGNIFICANT CHANGE UP (ref 7–23)
CALCIUM SERPL-MCNC: 8.9 MG/DL — SIGNIFICANT CHANGE UP (ref 8.5–10.1)
CHLORIDE SERPL-SCNC: 102 MMOL/L — SIGNIFICANT CHANGE UP (ref 96–108)
CO2 SERPL-SCNC: 29 MMOL/L — SIGNIFICANT CHANGE UP (ref 22–31)
CREAT SERPL-MCNC: 0.9 MG/DL — SIGNIFICANT CHANGE UP (ref 0.5–1.3)
GLUCOSE SERPL-MCNC: 96 MG/DL — SIGNIFICANT CHANGE UP (ref 70–99)
HCT VFR BLD CALC: 32.9 % — LOW (ref 34.5–45)
HGB BLD-MCNC: 11.2 G/DL — LOW (ref 11.5–15.5)
MCHC RBC-ENTMCNC: 32.9 PG — SIGNIFICANT CHANGE UP (ref 27–34)
MCHC RBC-ENTMCNC: 34 GM/DL — SIGNIFICANT CHANGE UP (ref 32–36)
MCV RBC AUTO: 96.8 FL — SIGNIFICANT CHANGE UP (ref 80–100)
PLATELET # BLD AUTO: 122 K/UL — LOW (ref 150–400)
POTASSIUM SERPL-MCNC: 4.3 MMOL/L — SIGNIFICANT CHANGE UP (ref 3.5–5.3)
POTASSIUM SERPL-SCNC: 4.3 MMOL/L — SIGNIFICANT CHANGE UP (ref 3.5–5.3)
PROT SERPL-MCNC: 6 GM/DL — SIGNIFICANT CHANGE UP (ref 6–8.3)
RBC # BLD: 3.4 M/UL — LOW (ref 3.8–5.2)
RBC # FLD: 12.3 % — SIGNIFICANT CHANGE UP (ref 10.3–14.5)
SODIUM SERPL-SCNC: 137 MMOL/L — SIGNIFICANT CHANGE UP (ref 135–145)
WBC # BLD: 4.8 K/UL — SIGNIFICANT CHANGE UP (ref 3.8–10.5)
WBC # FLD AUTO: 4.8 K/UL — SIGNIFICANT CHANGE UP (ref 3.8–10.5)

## 2022-02-13 PROCEDURE — 99232 SBSQ HOSP IP/OBS MODERATE 35: CPT

## 2022-02-13 RX ORDER — CHOLECALCIFEROL (VITAMIN D3) 125 MCG
4 CAPSULE ORAL
Qty: 0 | Refills: 0 | DISCHARGE

## 2022-02-13 RX ORDER — NIACIN 50 MG
1 TABLET ORAL
Qty: 0 | Refills: 0 | DISCHARGE

## 2022-02-13 RX ORDER — ASCORBIC ACID 60 MG
1 TABLET,CHEWABLE ORAL
Qty: 0 | Refills: 0 | DISCHARGE

## 2022-02-13 RX ORDER — ALPRAZOLAM 0.25 MG
1 TABLET ORAL
Qty: 0 | Refills: 0 | DISCHARGE

## 2022-02-13 RX ORDER — BACILLUS COAGULANS/INULIN 1B-250 MG
1 CAPSULE ORAL
Qty: 0 | Refills: 0 | DISCHARGE

## 2022-02-13 RX ADMIN — Medication 0.5 MILLIGRAM(S): at 09:46

## 2022-02-13 RX ADMIN — Medication 0.5 MILLIGRAM(S): at 02:07

## 2022-02-13 RX ADMIN — Medication 255 MILLIGRAM(S): at 06:02

## 2022-02-13 RX ADMIN — LOSARTAN POTASSIUM 100 MILLIGRAM(S): 100 TABLET, FILM COATED ORAL at 09:45

## 2022-02-13 RX ADMIN — Medication 0.5 MILLIGRAM(S): at 06:01

## 2022-02-13 RX ADMIN — ESCITALOPRAM OXALATE 20 MILLIGRAM(S): 10 TABLET, FILM COATED ORAL at 09:46

## 2022-02-13 RX ADMIN — Medication 4000 UNIT(S): at 09:46

## 2022-02-13 NOTE — DISCHARGE NOTE PROVIDER - NSDCMRMEDTOKEN_GEN_ALL_CORE_FT
escitalopram 20 mg oral tablet: 1 tab(s) orally once a day (in the morning)  Lipitor 40 mg oral tablet: 1 tab(s) orally once a day (at bedtime)  losartan 100 mg oral tablet: 1 tab(s) orally once a day (in the morning)

## 2022-02-13 NOTE — DISCHARGE NOTE NURSING/CASE MANAGEMENT/SOCIAL WORK - PATIENT PORTAL LINK FT
You can access the FollowMyHealth Patient Portal offered by [facility] by registering at the following website: [email or web address]. By joining [facility]’s FollowMyHealth portal, you will also be able to view your health information using other applications (apps) compatible with our system.

## 2022-02-13 NOTE — DISCHARGE NOTE NURSING/CASE MANAGEMENT/SOCIAL WORK - NSDCPEFALRISK_GEN_ALL_CORE
For information on Fall & Injury Prevention, visit: [email or web address] OR  [email or web address] OR  https://www.cdc.gov/steadi/patient.html

## 2022-02-13 NOTE — DISCHARGE NOTE PROVIDER - NSDCCPCAREPLAN_GEN_ALL_CORE_FT
PRINCIPAL DISCHARGE DIAGNOSIS  Diagnosis: Isopropyl alcohol poisoning  Assessment and Plan of Treatment: - scr improved  - patient doing well  - d/c IV fluids / additional supplements      SECONDARY DISCHARGE DIAGNOSES  Diagnosis: Alcohol abuse  Assessment and Plan of Treatment: - strongly recommended to stop drinking  - avoid alcohol exposure  - maintain good hydration    Diagnosis: Syncope  Assessment and Plan of Treatment: - resolved

## 2022-02-13 NOTE — PROGRESS NOTE ADULT - SUBJECTIVE AND OBJECTIVE BOX
Subjective:  Patient is a 66y old  Female who presents with a chief complaint of Altered Mental Status (10 Feb 2022 01:46)    Patient is a 67 y/o F PMHx significant for Hypertension, Hyperlipidemia, and alcohol abuse was BIBA from home for further evaluation and management of altered mental status. As per EMS the patient was found unresponsive at home by the patient's . Of note the patient attempted to stop drinking 2 days ago, but started drinking again today. Upon arrival to triage the patient was only arousable to noxious stimuli. HPI obtained from ED staff, EMS, and patient's  due to the patient's mental status. In the ED Toxicology was consulted. As the patient's mental status improved she admitted to ingesting "rubbing alcohol".  Vital Signs in Triage => /68, HR 77/min, RR 18/min, TMax 97.7'F, SaO2 98%.      Medical progress: Denies any HA, CP, SOB. No fevers, chills or shakes. Small shakes. d/c fluids today  Complaints: no new complaints  State of mind: maintains good hydration    REVIEW OF SYSTEMS:  General: NAD, hemodynamically stable   HEENT:  Eyes:  No visual loss   SKIN:  No rash or itching.  CARDIOVASCULAR:  No chest pain   RESPIRATORY:  No shortness of breath   GASTROINTESTINAL:  No anorexia, nausea   NEUROLOGICAL:  No headache, dizziness   MUSCULOSKELETAL:  No muscle, back pain   HEMATOLOGIC:  No anemia, bleeding or bruising.  LYMPHATICS:  No enlarged nodes. No history of splenectomy.  ENDOCRINOLOGIC:  No reports of sweating, cold or heat intolerance. No polyuria or polydipsia.  ALLERGIES:  No history of asthma, hives, eczema or rhinitis.    Physical Exam:   GENERAL APPEARANCE:  NAD, hemodynamically stable  ICU Vital Signs Last 24 Hrs  T(C): 36.6 (2022 08:45), Max: 37 (2022 14:02)  T(F): 97.9 (2022 08:45), Max: 98.6 (2022 14:02)  HR: 61 (2022 08:45) (61 - 73)  BP: 135/74 (2022 08:45) (129/77 - 144/67)  BP(mean): 89 (2022 08:45) (85 - 89)  RR: 18 (2022 08:45) (18 - 18)  SpO2: 97% (2022 08:45) (97% - 99%)  HEENT:  Head is normocephalic    Skin:  Warm and dry without any rash   NECK:  Supple without lymphadenopathy.   HEART:  Regular rate and rhythm. normal S1 and S2, No M/R/G  LUNGS:  Good ins/exp effort, no W/R/R/C  ABDOMEN:  Soft, nontender, nondistended with good bowel sounds heard  EXTREMITIES:  Without cyanosis, clubbing or edema.   NEUROLOGICAL:  Gross nonfocal     Labs:   CBC Full  -  ( 2022 06:33 )  WBC Count : 4.80 K/uL  RBC Count : 3.40 M/uL  Hemoglobin : 11.2 g/dL  Hematocrit : 32.9 %  Platelet Count - Automated : 122 K/uL    Urinalysis Basic - ( 2022 18:27 )    Color: Yellow / Appearance: Clear / S.015 / pH: x  Gluc: x / Ketone: Small  / Bili: Negative / Urobili: Negative   Blood: x / Protein: See Note / Nitrite: Negative   Leuk Esterase: Negative / RBC: x / WBC x   Sq Epi: x / Non Sq Epi: x / Bacteria: x      -    137  |  102  |  8   ----------------------------<  96  4.3   |  29  |  0.90    Ca    8.9      2022 06:33  Phos  3.3     02-12  Mg     1.7     02-12    TPro  6.0  /  Alb  3.0<L>  /  TBili  0.7  /  DBili  x   /  AST  6<L>  /  ALT  28  /  AlkPhos  36<L>  02-13      67 y/o F PMHx significant for Hypertension, Hyperlipidemia, and alcohol abuse was BIBA from home for further evaluation and management of altered mental status. As per EMS the patient was found unresponsive at home by the patient's . Of note the patient attempted to stop drinking 2 days ago, but started drinking again today. Upon arrival to triage the patient was only arousable to noxious stimuli. HPI obtained from ED staff, EMS, and patient's  due to the patient's mental status. In the ED Toxicology was consulted. As the patient's mental status improved she admitted to ingesting "rubbing alcohol".  Vital Signs in Triage => /68, HR 77/min, RR 18/min, TMax 97.7'F, SaO2 98%.    # Acute Isopropyl Alcohol Toxicity  # Ethyl Alcohol Withdrawal  - scr improved  - patient doing well  - d/c IV fluids / additional supplements    # Toxic Metabolic encephalopathy   - Improved     # ALEJANDRO improving  - resolved    # Hypertension  - cont. Losartan 100mg po daily    # Hyperlipidemia  - cont. Atorvastatin 40mg po qhs    # Depression  - cont. Escitalopram 20mg po daily    # Vte ppx  - IMPROVE Vte Risk Score is 1

## 2022-02-13 NOTE — DISCHARGE NOTE PROVIDER - CARE PROVIDER_API CALL
Lewis Reyes)  Family Medicine  [address], [address]  [place] [address]  Phone: [phone]  Fax: [phone]  Follow Up Time:

## 2022-02-14 ENCOUNTER — NON-APPOINTMENT (OUTPATIENT)
Age: 67
End: 2022-02-14

## 2022-02-14 LAB
ACETONE, GCMS SCREEN URN: 153 MG/DL
ETHYLENE GLYCOL SERPLBLD-MCNC: <5 MG/DL — SIGNIFICANT CHANGE UP
ISOPROPANOL GCMS: SIGNIFICANT CHANGE UP

## 2022-02-16 DIAGNOSIS — Z86.19 PERSONAL HISTORY OF OTHER INFECTIOUS AND PARASITIC DISEASES: ICD-10-CM

## 2022-02-17 DIAGNOSIS — F34.1 DYSTHYMIC DISORDER: ICD-10-CM

## 2022-02-17 DIAGNOSIS — T51.2X1A TOXIC EFFECT OF 2-PROPANOL, ACCIDENTAL (UNINTENTIONAL), INITIAL ENCOUNTER: ICD-10-CM

## 2022-02-17 DIAGNOSIS — N17.9 ACUTE KIDNEY FAILURE, UNSPECIFIED: ICD-10-CM

## 2022-02-17 DIAGNOSIS — G92.8 OTHER TOXIC ENCEPHALOPATHY: ICD-10-CM

## 2022-02-17 DIAGNOSIS — I10 ESSENTIAL (PRIMARY) HYPERTENSION: ICD-10-CM

## 2022-02-17 DIAGNOSIS — F10.139 ALCOHOL ABUSE WITH WITHDRAWAL, UNSPECIFIED: ICD-10-CM

## 2022-02-17 LAB — METHANOL BLD-MCNC: <.01 G/DL — SIGNIFICANT CHANGE UP (ref 0–0.01)

## 2022-02-18 ENCOUNTER — APPOINTMENT (OUTPATIENT)
Dept: FAMILY MEDICINE | Facility: CLINIC | Age: 67
End: 2022-02-18
Payer: MEDICARE

## 2022-02-18 VITALS
DIASTOLIC BLOOD PRESSURE: 72 MMHG | OXYGEN SATURATION: 97 % | SYSTOLIC BLOOD PRESSURE: 130 MMHG | HEIGHT: 62 IN | TEMPERATURE: 98 F | WEIGHT: 153 LBS | BODY MASS INDEX: 28.16 KG/M2 | HEART RATE: 76 BPM

## 2022-02-18 DIAGNOSIS — Z87.09 PERSONAL HISTORY OF OTHER DISEASES OF THE RESPIRATORY SYSTEM: ICD-10-CM

## 2022-02-18 DIAGNOSIS — F41.1 GENERALIZED ANXIETY DISORDER: ICD-10-CM

## 2022-02-18 DIAGNOSIS — E78.00 PURE HYPERCHOLESTEROLEMIA, UNSPECIFIED: ICD-10-CM

## 2022-02-18 DIAGNOSIS — Z91.018 ALLERGY TO OTHER FOODS: ICD-10-CM

## 2022-02-18 DIAGNOSIS — Z78.9 OTHER SPECIFIED HEALTH STATUS: ICD-10-CM

## 2022-02-18 DIAGNOSIS — M94.8X9 OTHER SPECIFIED DISORDERS OF CARTILAGE, UNSPECIFIED SITES: ICD-10-CM

## 2022-02-18 DIAGNOSIS — R21 RASH AND OTHER NONSPECIFIC SKIN ERUPTION: ICD-10-CM

## 2022-02-18 DIAGNOSIS — B00.1 HERPESVIRAL VESICULAR DERMATITIS: ICD-10-CM

## 2022-02-18 DIAGNOSIS — N30.00 ACUTE CYSTITIS W/OUT HEMATURIA: ICD-10-CM

## 2022-02-18 PROCEDURE — 36415 COLL VENOUS BLD VENIPUNCTURE: CPT

## 2022-02-18 PROCEDURE — G0438: CPT

## 2022-02-18 PROCEDURE — 90715 TDAP VACCINE 7 YRS/> IM: CPT | Mod: GY

## 2022-02-18 PROCEDURE — 90471 IMMUNIZATION ADMIN: CPT

## 2022-02-18 RX ORDER — ALPRAZOLAM 0.5 MG/1
0.5 TABLET ORAL
Refills: 0 | Status: COMPLETED | COMMUNITY
End: 2022-02-18

## 2022-02-18 RX ORDER — AMOXICILLIN AND CLAVULANATE POTASSIUM 875; 125 MG/1; MG/1
875-125 TABLET, COATED ORAL
Qty: 28 | Refills: 0 | Status: COMPLETED | COMMUNITY
Start: 2021-11-17 | End: 2022-02-18

## 2022-02-18 RX ORDER — ORPHENADRINE CITRATE 100 MG/1
100 TABLET, EXTENDED RELEASE ORAL
Refills: 0 | Status: COMPLETED | COMMUNITY
End: 2022-02-18

## 2022-02-18 NOTE — HEALTH RISK ASSESSMENT
[Good] : ~his/her~  mood as  good [Never] : Never [No] : In the past 12 months have you used drugs other than those required for medical reasons? No [No falls in past year] : Patient reported no falls in the past year [0] : 2) Feeling down, depressed, or hopeless: Not at all (0) [PHQ-2 Negative - No further assessment needed] : PHQ-2 Negative - No further assessment needed [Patient reported mammogram was normal] : Patient reported mammogram was normal [Patient reported PAP Smear was normal] : Patient reported PAP Smear was normal [Patient reported bone density results were abnormal] : Patient reported bone density results were abnormal [Patient reported colonoscopy was abnormal] : Patient reported colonoscopy was abnormal [HIV test declined] : HIV test declined [Hepatitis C test declined] : Hepatitis C test declined [None] : None [With Family] : lives with family [Retired] : retired [] :  [# Of Children ___] : has [unfilled] children [Sexually Active] : sexually active [Feels Safe at Home] : Feels safe at home [Fully functional (bathing, dressing, toileting, transferring, walking, feeding)] : Fully functional (bathing, dressing, toileting, transferring, walking, feeding) [Fully functional (using the telephone, shopping, preparing meals, housekeeping, doing laundry, using] : Fully functional and needs no help or supervision to perform IADLs (using the telephone, shopping, preparing meals, housekeeping, doing laundry, using transportation, managing medications and managing finances) [Reports normal functional visual acuity (ie: able to read med bottle)] : Reports normal functional visual acuity [Smoke Detector] : smoke detector [Carbon Monoxide Detector] : carbon monoxide detector [Safety elements used in home] : safety elements used in home [Seat Belt] :  uses seat belt [Sunscreen] : uses sunscreen [Patient/Caregiver not ready to engage] : , patient/caregiver not ready to engage [de-identified] : active [de-identified] : well balanced, low fat low chol  [BND2Ldata] : 0 [EyeExamDate] : 2021 [Change in mental status noted] : No change in mental status noted [Language] : denies difficulty with language [High Risk Behavior] : no high risk behavior [Reports changes in hearing] : Reports no changes in hearing [Reports changes in vision] : Reports no changes in vision [Reports changes in dental health] : Reports no changes in dental health [Guns at Home] : no guns at home [Travel to Developing Areas] : does not  travel to developing areas [MammogramDate] : 04/21 [PapSmearDate] : 04/21 [BoneDensityDate] : 04/21 [BoneDensityComments] : osteopenia, improving [ColonoscopyDate] : 11/19 [ColonoscopyComments] : scheduling  [AdvancecareDate] : 02/22

## 2022-02-18 NOTE — COUNSELING
[Fall prevention counseling provided] : Fall prevention counseling provided [Behavioral health counseling provided] : Behavioral health counseling provided [Sleep ___ hours/day] : Sleep [unfilled] hours/day [Engage in a relaxing activity] : Engage in a relaxing activity [Plan in advance] : Plan in advance [Strategies to reduce or eliminate alcohol use discussed] : Strategies to reduce or eliminate alcohol use discussed [Support options provided] : Support options provided [Quit Drinking] : Quit Drinking [Participate in Treatment Program] : Participate in treatment program [None] : None [Good understanding] : Patient has a good understanding of lifestyle changes and steps needed to achieve self management goal [de-identified] : Maintain balanced diet of whole grain, fruits and vegetables, lean meats, skinless poultry, fish, beans, soy products, eggs, nuts, and low fat dairy as per FDA dietary guidelines. \par Avoid high calorie/low nutrient foods. \par vegetables/fruits- at least 5 servings/day, \par whole grains- 100% whole grain and at least 3 grams fiber/day.\par reduce/eliminate saturated fat- less than 5% on nutrition labels (ex. barajas, deli meat, hot dogs, fried foods, cookies, ice cream, chips, soft drinks, etc.)\par stay within your FDA recommended daily calorie needs or use the plate method to portion intake. \par Avoid fast food and limit eating out. When eating out choose healthy alternatives (ex. grilled, baked, steamed. low fat dressings. avoid french fries).\par DO NOT CONSUME FOODS YOU ARE ALLERGIC TO DESPITE DIETARY RECOMMENDATIONS.\par \par For more information you can visit www.Health.gov \par \par Incorporate regular physical activity most days of the week. \par Regular aerobic activity- moderate intensity, most days/wk, at least 30min/day- ex. brisk walk 2.5miles/hr, ballroom dancing, water aerobics, light yard work (raking/lawn mowing) actively playing basketball, biking 10miles/hr.\par Muscle strengthening and strength/resistance exercises 2-3days/wk- ex. free weights, resistance bands, your own weight (squats/pull-ups/push-ups).\par Neuro-motor exercises for balance/agility/coordination and flexibility exercises 2-3days/wk, 20-30min/day- ex. yoga and grecia-chi.\par Bone strengthening at least 30min/day, most days of the week- ex. weights, resistance bands, running, brisk walking, jumping rope, stair climbing, tennis.\par Decrease sedentary time. \par LISTEN TO YOUR BODY AND MODIFY ACTIVITY AS NEEDED- DO NOT OVEREXERT YOURSELF DURING PHYSICAL ACTIVITY DESPITE RECOMMENDATION.\par \par For more information you can visit www.Health.gov \par \par Depression/Anxiety are mood disorders. The symptoms of depression/anxiety can affect how you think and feel and how you handle every day activities (work, eating, sleeping). Multiple treatments are available such as psychotherapy/counseling, medications called antidepressants or anxiolytics, or other therapies. \par Some medications can take 2-4 weeks to work and can have side effects; notify our office if you experience any side effects. Suicidal thoughts or attempt may occur in some people, especially if agitated when first initiating medication, before it begins to work; if suicidal thoughts/ideations or suicidal attempt- call 911 for emergency services/go to the nearest emergency department. \par IF IN CRISIS YOU HAVE SUPPORT= CALL 911/EMERGENCY SERVICES, CALL [facility] [phone], CALL OUR OFFICE.

## 2022-02-18 NOTE — PHYSICAL EXAM
[No Acute Distress] : no acute distress [Well Nourished] : well nourished [Well Developed] : well developed [Normal Sclera/Conjunctiva] : normal sclera/conjunctiva [PERRL] : pupils equal round and reactive to light [EOMI] : extraocular movements intact [No Respiratory Distress] : no respiratory distress  [No Accessory Muscle Use] : no accessory muscle use [Clear to Auscultation] : lungs were clear to auscultation bilaterally [Declined Breast Exam] : declined breast exam  [Declined Rectal Exam] : declined rectal exam [Normal Posterior Cervical Nodes] : no posterior cervical lymphadenopathy [Normal Anterior Cervical Nodes] : no anterior cervical lymphadenopathy [No Rash] : no rash [Coordination Grossly Intact] : coordination grossly intact [Normal Gait] : normal gait [Normal Mental Status] : the patient's orientation, memory, attention, language and fund of knowledge were normal [Appropriate] : appropriate [Normal Rate] : a normal rate [Normal Rhythm] : a normal rhythm [Normal Tone] : normal tone [Normal Volume] : normal volume [Normal] : normal throught processes [Normal Associations] :  no deficiency [Impaired judgment] : intact judgment [Impaired Insight] : intact insight [Delusions] : exhibited no delusions [Hallucinations] : exhibited no hallucinations [Obsessions] : denied obsessions [Preoccupation with Violence] : denied preoccupation with violent thoughts [Suicidal Ideation] : denied suicidal ideation [Suicidal Intent] : denied suicidal intent [Suicidal Plan] : denied suicidal plans [Homicidal Ideation] : denied homicidal ideation [Homicidal Intent] : denied homicidal intention [Homicidal Plan] : denied homicidal plans [de-identified] : GYN [FreeTextEntry1] : colonoscopy

## 2022-02-18 NOTE — HISTORY OF PRESENT ILLNESS
[FreeTextEntry1] : here for annual well check [de-identified] : this is a 65yo pmhx htn/ hld, on Losartan, Atorvastatin, low dose Asa, patient of Dr. Franco, HSV1/ anxiety/ hx recovering alcohol use disorder in recovery, last episode relapse was one week ago, she ingested isopropyl alcohol available in her house, transferred in  admitted and tx 3 days, discharged home and she is now doing well- reports having adequate support for continued sobriety, attends [facility], on naltrexone, sees psychiatry monthly, attends meetings/ counseling.\par o/w in no acute distress, no other major concerns and reports feeling well. \par will evaluate and manage as appropriate.

## 2022-02-18 NOTE — ASSESSMENT
[FreeTextEntry1] : annual 02/18/2022\par colonoscopy 11/2019 polyp/ diverticulosis, rpt 3yrs, previous CX with Dr. Farmer. requested new provider- contact given for Dr. Hall, scheduling.\par DEXA 04/22/21 osteopenia improved, on VitD and calcium \par GYN 2018, nml, no longer completes- refuses\par mammo 04/21 nml BiRADSs1 rpt 1 yr\par skin checks with Dr. Gill\par ophthalmology- 2021, Dr. Castaneda\par pneumo- completing at pharmacy. Tdap- counseled on vaccine, administered today, tolerated well. \par full fasting BW - "labs drawn in office" \par \par htn/ hld\par on Losartan, Atorvastatin, low dose Asa, omega 3 fish oil\par BP well managed\par patient of Dr. Franco, last visit 12/21, obtaining reports\par stress/echo completed 2021, reports normal workup \par in no acute distress and o/w offers no complaints\par medication renewal provided as requested\par \par HSV1\par on Valacyclovir and acyclovir ointment\par well managed \par in no acute distress and o/w offers no complaints \par medication renewal provided as requested \par \par anxiety/ hx recovering alcohol use disorder in recovery, last episode relapse was one week ago\par well managed anxiety sx;\par on daily Lexapro, prn Alprazolam taken infrequently- counseled on use, reports taking medication as prescribed, tolerating well.\par reports having adequate support for continued sobriety;\par [facility], on naltrexone, sees psychiatry monthly, attends meets/ counseling.\par in no acute distress and o/w offers no complaints \par

## 2022-02-22 LAB
25(OH)D3 SERPL-MCNC: 56.1 NG/ML
ALBUMIN SERPL ELPH-MCNC: 5.1 G/DL
ALP BLD-CCNC: 41 U/L
ALT SERPL-CCNC: 21 U/L
ANION GAP SERPL CALC-SCNC: 13 MMOL/L
AST SERPL-CCNC: 10 U/L
BASOPHILS # BLD AUTO: 0.03 K/UL
BASOPHILS NFR BLD AUTO: 0.4 %
BILIRUB SERPL-MCNC: 0.8 MG/DL
BUN SERPL-MCNC: 26 MG/DL
CALCIUM SERPL-MCNC: 9.6 MG/DL
CHLORIDE SERPL-SCNC: 104 MMOL/L
CHOLEST SERPL-MCNC: 183 MG/DL
CO2 SERPL-SCNC: 23 MMOL/L
CREAT SERPL-MCNC: 0.81 MG/DL
EOSINOPHIL # BLD AUTO: 0.12 K/UL
EOSINOPHIL NFR BLD AUTO: 1.7 %
ESTIMATED AVERAGE GLUCOSE: 103 MG/DL
FERRITIN SERPL-MCNC: 154 NG/ML
FOLATE SERPL-MCNC: >20 NG/ML
GLUCOSE SERPL-MCNC: 109 MG/DL
HBA1C MFR BLD HPLC: 5.2 %
HCT VFR BLD CALC: 37.9 %
HDLC SERPL-MCNC: 71 MG/DL
HGB BLD-MCNC: 12.3 G/DL
IMM GRANULOCYTES NFR BLD AUTO: 0.4 %
IRON SATN MFR SERPL: 21 %
IRON SERPL-MCNC: 77 UG/DL
LDLC SERPL CALC-MCNC: 95 MG/DL
LYMPHOCYTES # BLD AUTO: 1.78 K/UL
LYMPHOCYTES NFR BLD AUTO: 25.1 %
MAN DIFF?: NORMAL
MCHC RBC-ENTMCNC: 32.4 PG
MCHC RBC-ENTMCNC: 32.5 GM/DL
MCV RBC AUTO: 99.7 FL
MONOCYTES # BLD AUTO: 0.67 K/UL
MONOCYTES NFR BLD AUTO: 9.5 %
NEUTROPHILS # BLD AUTO: 4.45 K/UL
NEUTROPHILS NFR BLD AUTO: 62.9 %
NONHDLC SERPL-MCNC: 112 MG/DL
PLATELET # BLD AUTO: 199 K/UL
POTASSIUM SERPL-SCNC: 4.8 MMOL/L
PROT SERPL-MCNC: 7 G/DL
RBC # BLD: 3.8 M/UL
RBC # FLD: 13.3 %
SODIUM SERPL-SCNC: 140 MMOL/L
T4 FREE SERPL-MCNC: 0.9 NG/DL
TIBC SERPL-MCNC: 358 UG/DL
TRIGL SERPL-MCNC: 83 MG/DL
TSH SERPL-ACNC: 0.78 UIU/ML
UIBC SERPL-MCNC: 281 UG/DL
VIT B12 SERPL-MCNC: 667 PG/ML
WBC # FLD AUTO: 7.08 K/UL

## 2022-02-24 ENCOUNTER — NON-APPOINTMENT (OUTPATIENT)
Age: 67
End: 2022-02-24

## 2022-02-25 PROBLEM — F41.8 OTHER SPECIFIED ANXIETY DISORDERS: Chronic | Status: ACTIVE | Noted: 2022-02-10

## 2022-03-22 ENCOUNTER — LABORATORY RESULT (OUTPATIENT)
Age: 67
End: 2022-03-22

## 2022-03-22 ENCOUNTER — APPOINTMENT (OUTPATIENT)
Dept: ALLERGY | Facility: CLINIC | Age: 67
End: 2022-03-22
Payer: MEDICARE

## 2022-03-22 VITALS
HEART RATE: 76 BPM | SYSTOLIC BLOOD PRESSURE: 130 MMHG | BODY MASS INDEX: 28.71 KG/M2 | HEIGHT: 62 IN | WEIGHT: 156 LBS | OXYGEN SATURATION: 97 % | DIASTOLIC BLOOD PRESSURE: 71 MMHG

## 2022-03-22 PROCEDURE — 99204 OFFICE O/P NEW MOD 45 MIN: CPT

## 2022-03-22 RX ORDER — COVID-19 ANTIGEN TEST
KIT MISCELLANEOUS
Qty: 6 | Refills: 0 | Status: DISCONTINUED | COMMUNITY
Start: 2022-02-22

## 2022-03-25 LAB
GALACTOSE, ALPHA (O215) CONC: 0.12 KUA/L
GALACTOSE-ALPHA-1,3-GALACTOSE IGE: ABNORMAL

## 2022-03-29 ENCOUNTER — NON-APPOINTMENT (OUTPATIENT)
Age: 67
End: 2022-03-29

## 2022-03-29 NOTE — REVIEW OF SYSTEMS
[Nl] : Genitourinary [FreeTextEntry5] : hypertension  [FreeTextEntry7] : alcohol abuse on naltrexone

## 2022-03-29 NOTE — CONSULT LETTER
[Dear  ___] : Dear  [unfilled], [Thank you for referring [unfilled] for consultation for _____] : Thank you for referring [unfilled] for consultation for [unfilled] [Please see my note below.] : Please see my note below. [Sincerely,] : Sincerely, [FreeTextEntry3] : Mitchell B. Boxer, M.D., FAAAAI\par [facility]\par \par Department of Allergy-Immunology\par [facility] \par [address] [place] [address]\par [place] [address]\par Tel:   [phone]\par Fax:  [phone]\par Email: [email or web address]\par

## 2022-03-29 NOTE — HISTORY OF PRESENT ILLNESS
[Asthma] : asthma [Eczematous rashes] : eczematous rashes [Venom Reactions] : venom reactions [de-identified] : Patient reports in 5/21 - home on [place] - hikes a lot - she noted a swelling in her left ear and she noted a tick on her ear - removed with a tweezer - treated with Doxycycline - blood work was performed and her ear was infected and she was treated with Cipro.   Patient found to have a positive antibody to alpha gal.   She consulted with an MD in [place] - recommended that she avoid beef, pork and lamb.   She had already stopped eating beef, pork and lamb.   She was also prescribed an EpiPen.   Patient has stopped working in her yard secondary to ticks. \par \par Patient was advised that she had a DNS - she feels constant sneezing and rhinorrhea.    Allergy skin testing was negative.  Symptoms ongoing x years.

## 2022-03-29 NOTE — PHYSICAL EXAM
[Alert] : alert [Well Nourished] : well nourished [No Acute Distress] : no acute distress [Well Developed] : well developed [Normal Lips/Tongue] : the lips and tongue were normal [Normal Tonsils] : normal tonsils [No Neck Mass] : no neck mass was observed [No LAD] : no lymphadenopathy [Normal Rate and Effort] : normal respiratory rhythm and effort [No Crackles] : no crackles [No Retractions] : no retractions [Bilateral Audible Breath Sounds] : bilateral audible breath sounds [Normal Rate] : heart rate was normal  [Normal S1, S2] : normal S1 and S2 [No murmur] : no murmur [Regular Rhythm] : with a regular rhythm [Skin Intact] : skin intact  [No Joint Swelling or Erythema] : no joint swelling or erythema [No clubbing] : no clubbing [No Edema] : no edema [No Cyanosis] : no cyanosis [Normal Mood] : mood was normal [Normal Affect] : affect was normal [Judgment and Insight Age Appropriate] : judgement and insight is age appropriate [Alert, Awake, Oriented as Age-Appropriate] : alert, awake, oriented as age appropriate [Wheezing] : no wheezing was heard [de-identified] : mild deviated septum to the right

## 2022-03-29 NOTE — SOCIAL HISTORY
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

## 2022-03-29 NOTE — ASSESSMENT
[FreeTextEntry1] : Nonallergic rhinitis:\par \par Ipratropium 0.03% TID \par \par Alpha Gal syndrome:\par \par Repeat Alpha Gal IgE level \par Continue avoidance of beef - lamb - pork \par EpiPen renewed.   Patient instructed on the proper use of an EpiPen - precautions - follow up after use of EpiPen - need to have device available at all times\par Patient instructed to have Benadryl, in addition to EpiPen, available at all times - reviewed indications for use of Benadryl - dosing - precautions and follow up.\par

## 2022-04-06 ENCOUNTER — TRANSCRIPTION ENCOUNTER (OUTPATIENT)
Age: 67
End: 2022-04-06

## 2022-04-26 ENCOUNTER — NON-APPOINTMENT (OUTPATIENT)
Age: 67
End: 2022-04-26

## 2022-04-27 ENCOUNTER — TRANSCRIPTION ENCOUNTER (OUTPATIENT)
Age: 67
End: 2022-04-27

## 2022-04-29 ENCOUNTER — TRANSCRIPTION ENCOUNTER (OUTPATIENT)
Age: 67
End: 2022-04-29

## 2022-04-29 ENCOUNTER — NON-APPOINTMENT (OUTPATIENT)
Age: 67
End: 2022-04-29

## 2022-04-29 ENCOUNTER — APPOINTMENT (OUTPATIENT)
Dept: FAMILY MEDICINE | Facility: CLINIC | Age: 67
End: 2022-04-29
Payer: MEDICARE

## 2022-04-29 ENCOUNTER — RESULT CHARGE (OUTPATIENT)
Age: 67
End: 2022-04-29

## 2022-04-29 VITALS
OXYGEN SATURATION: 97 % | HEART RATE: 70 BPM | WEIGHT: 155 LBS | TEMPERATURE: 97.4 F | HEIGHT: 62 IN | SYSTOLIC BLOOD PRESSURE: 142 MMHG | BODY MASS INDEX: 28.52 KG/M2 | DIASTOLIC BLOOD PRESSURE: 68 MMHG

## 2022-04-29 LAB
BILIRUB UR QL STRIP: NORMAL
GLUCOSE UR-MCNC: NORMAL
HCG UR QL: 0.2 EU/DL
HGB UR QL STRIP.AUTO: NORMAL
KETONES UR-MCNC: NORMAL
LEUKOCYTE ESTERASE UR QL STRIP: NORMAL
NITRITE UR QL STRIP: NORMAL
PH UR STRIP: 5.5
PROT UR STRIP-MCNC: NORMAL
SP GR UR STRIP: 1.01

## 2022-04-29 PROCEDURE — 81003 URINALYSIS AUTO W/O SCOPE: CPT | Mod: QW

## 2022-04-29 PROCEDURE — 99213 OFFICE O/P EST LOW 20 MIN: CPT | Mod: 25

## 2022-04-29 NOTE — ASSESSMENT
[FreeTextEntry1] : She has dysuria with small leukocytes on her U/A. She has a history of frequent UTIs and, although this feels different from her typical symptoms, she feels that she needs an antibiotic.

## 2022-04-29 NOTE — PHYSICAL EXAM
[No Respiratory Distress] : no respiratory distress  [Soft] : abdomen soft [Non Tender] : non-tender [Non-distended] : non-distended [No CVA Tenderness] : no CVA  tenderness [Grossly Normal Strength/Tone] : grossly normal strength/tone [No Focal Deficits] : no focal deficits [Normal] : affect was normal and insight and judgment were intact

## 2022-04-29 NOTE — HISTORY OF PRESENT ILLNESS
[FreeTextEntry8] : Patient has a history of frequent UTI and developed dysuria about a week ago. The dysuria is mild but she has noticed a strong odor to her urine. She also has increased urinary urgency and frequency. \par \par She admits that she and her  have been eating more raw cabbage in salads lately and wonders if this is responsible for the odor of her urine. \par \par Of note, her  (also a patient here) came to the office on 4/25/22 with urinary symptoms as well.

## 2022-04-29 NOTE — PLAN
[FreeTextEntry1] : Will send out a urine culture and treat empirically with Nitrofurantoin. If the culture is negative or the dysuria resolves but the odor persists, that symptom may be due to her diet (raw cabbage).

## 2022-05-03 ENCOUNTER — TRANSCRIPTION ENCOUNTER (OUTPATIENT)
Age: 67
End: 2022-05-03

## 2022-05-03 LAB — BACTERIA UR CULT: ABNORMAL

## 2022-05-04 ENCOUNTER — NON-APPOINTMENT (OUTPATIENT)
Age: 67
End: 2022-05-04

## 2022-05-25 ENCOUNTER — RX RENEWAL (OUTPATIENT)
Age: 67
End: 2022-05-25

## 2022-06-22 ENCOUNTER — FORM ENCOUNTER (OUTPATIENT)
Age: 67
End: 2022-06-22

## 2022-12-19 ENCOUNTER — APPOINTMENT (OUTPATIENT)
Dept: GASTROENTEROLOGY | Facility: CLINIC | Age: 67
End: 2022-12-19

## 2022-12-19 VITALS
HEIGHT: 62 IN | WEIGHT: 165 LBS | HEART RATE: 98 BPM | DIASTOLIC BLOOD PRESSURE: 96 MMHG | SYSTOLIC BLOOD PRESSURE: 186 MMHG | BODY MASS INDEX: 30.36 KG/M2

## 2022-12-19 DIAGNOSIS — Z12.11 ENCOUNTER FOR SCREENING FOR MALIGNANT NEOPLASM OF COLON: ICD-10-CM

## 2022-12-19 PROCEDURE — 99203 OFFICE O/P NEW LOW 30 MIN: CPT

## 2022-12-19 NOTE — PHYSICAL EXAM
[Alert] : alert [Healthy Appearing] : healthy appearing [Sclera] : the sclera and conjunctiva were normal [Bowel Sounds] : normal bowel sounds [Abdomen Tenderness] : non-tender [Abdomen Soft] : soft [Normal Color / Pigmentation] : normal skin color and pigmentation [Oriented To Time, Place, And Person] : oriented to person, place, and time

## 2022-12-22 NOTE — ASSESSMENT
[FreeTextEntry1] : Plan:\par Recommend colonoscopy for screening purposes. Risks versus benefits as well as instructions reviewed, pt agrees to plan. All questions answered, pt expressed understanding. Discussed with Dr. Alba. I have spent 35 minutes on this encounter.

## 2022-12-22 NOTE — HISTORY OF PRESENT ILLNESS
[FreeTextEntry1] : Padmaja Bowers is a 67 year old female PMH HTN and HLD presents today for consult for screening colonoscopy. Had previous procedure about 5 years ago with another physician, reportedly had some polyps and was recommended to repeat in 5 years. Denies FMH of CRC or any GI complaints, typically has bowel movements daily without significant straining or overt bleeding such as melena or hematochezia. Denies upper GI symptoms such as nausea, vomiting, dysphagia, or odynophagia.

## 2023-01-02 ENCOUNTER — FORM ENCOUNTER (OUTPATIENT)
Age: 68
End: 2023-01-02

## 2023-01-04 RX ORDER — NALTREXONE HYDROCHLORIDE 50 MG/1
50 TABLET, FILM COATED ORAL
Qty: 42 | Refills: 0 | Status: COMPLETED | COMMUNITY
Start: 2022-02-28 | End: 2023-01-04

## 2023-01-08 ENCOUNTER — FORM ENCOUNTER (OUTPATIENT)
Age: 68
End: 2023-01-08

## 2023-01-12 ENCOUNTER — FORM ENCOUNTER (OUTPATIENT)
Age: 68
End: 2023-01-12

## 2023-01-12 ENCOUNTER — APPOINTMENT (OUTPATIENT)
Dept: FAMILY MEDICINE | Facility: CLINIC | Age: 68
End: 2023-01-12
Payer: MEDICARE

## 2023-01-12 VITALS
TEMPERATURE: 98.7 F | HEIGHT: 62 IN | HEART RATE: 99 BPM | OXYGEN SATURATION: 98 % | DIASTOLIC BLOOD PRESSURE: 82 MMHG | BODY MASS INDEX: 29.44 KG/M2 | SYSTOLIC BLOOD PRESSURE: 150 MMHG | WEIGHT: 160 LBS

## 2023-01-12 VITALS — SYSTOLIC BLOOD PRESSURE: 125 MMHG | DIASTOLIC BLOOD PRESSURE: 78 MMHG

## 2023-01-12 PROCEDURE — 99214 OFFICE O/P EST MOD 30 MIN: CPT

## 2023-01-12 NOTE — COUNSELING
[Fall prevention counseling provided] : Fall prevention counseling provided [Behavioral health counseling provided] : Behavioral health counseling provided [Sleep ___ hours/day] : Sleep [unfilled] hours/day [Engage in a relaxing activity] : Engage in a relaxing activity [Plan in advance] : Plan in advance [None] : None [Good understanding] : Patient has a good understanding of lifestyle changes and steps needed to achieve self management goal [de-identified] : Maintain balanced diet of whole grain, fruits and vegetables, lean meats, skinless poultry, fish, beans, soy products, eggs, nuts, and low fat dairy as per FDA dietary guidelines. \par Avoid high calorie/low nutrient foods. \par vegetables/fruits- at least 5 servings/day, \par whole grains- 100% whole grain and at least 3 grams fiber/day.\par reduce/eliminate saturated fat- less than 5% on nutrition labels (ex. barajas, deli meat, hot dogs, fried foods, cookies, ice cream, chips, soft drinks, etc.)\par stay within your FDA recommended daily calorie needs or use the plate method to portion intake. \par Avoid fast food and limit eating out. When eating out choose healthy alternatives (ex. grilled, baked, steamed. low fat dressings. avoid french fries).\par DO NOT CONSUME FOODS YOU ARE ALLERGIC TO DESPITE DIETARY RECOMMENDATIONS.\par \par For more information you can visit www.Health.gov \par \par Incorporate regular physical activity most days of the week. \par Regular aerobic activity- moderate intensity, most days/wk, at least 30min/day- ex. brisk walk 2.5miles/hr, ballroom dancing, water aerobics, light yard work (raking/lawn mowing) actively playing basketball, biking 10miles/hr.\par Muscle strengthening and strength/resistance exercises 2-3days/wk- ex. free weights, resistance bands, your own weight (squats/pull-ups/push-ups).\par Neuro-motor exercises for balance/agility/coordination and flexibility exercises 2-3days/wk, 20-30min/day- ex. yoga and grecia-chi.\par Bone strengthening at least 30min/day, most days of the week- ex. weights, resistance bands, running, brisk walking, jumping rope, stair climbing, tennis.\par Decrease sedentary time. \par LISTEN TO YOUR BODY AND MODIFY ACTIVITY AS NEEDED- DO NOT OVEREXERT YOURSELF DURING PHYSICAL ACTIVITY DESPITE RECOMMENDATION.\par \par For more information you can visit www.Health.gov \par \par Depression/Anxiety are mood disorders. The symptoms of depression/anxiety can affect how you think and feel and how you handle every day activities (work, eating, sleeping). Multiple treatments are available such as psychotherapy/counseling, medications called antidepressants or anxiolytics, or other therapies. \par Some medications can take 2-4 weeks to work and can have side effects; notify our office if you experience any side effects. Suicidal thoughts or attempt may occur in some people, especially if agitated when first initiating medication, before it begins to work; if suicidal thoughts/ideations or suicidal attempt- call 911 for emergency services/go to the nearest emergency department. \par IF IN CRISIS YOU HAVE SUPPORT= CALL 911/EMERGENCY SERVICES, CALL [facility] [phone], CALL OUR OFFICE.

## 2023-01-12 NOTE — REVIEW OF SYSTEMS
[Abdominal Pain] : no abdominal pain [Nausea] : nausea [Constipation] : no constipation [Diarrhea] : diarrhea [Vomiting] : no vomiting [Heartburn] : no heartburn [Melena] : no melena [Skin Rash] : no skin rash [Headache] : no headache [Dizziness] : dizziness [Fainting] : no fainting [Confusion] : no confusion [Memory Loss] : memory loss [Unsteady Walking] : no ataxia [Negative] : Heme/Lymph [de-identified] : anxiety and nervousness

## 2023-01-12 NOTE — HISTORY OF PRESENT ILLNESS
[FreeTextEntry1] : here for f/u substance use relapse [de-identified] : this is a 66yo patient of Dr. Reyes here for f/u visit, patient had reported recent relapse with alcohol use. patient did notify provider of her relapse last week, requesting to restart Naltrexone. NP provided partial refill of Naltrexone medication and advised f/u apt. ASAP which patient presents for today. \par \par patient today admits to not being forthright/ honest with information provided to NP, patient actually relapsed in July 2023, reports has been intermittently drinking since then, reports about 6 cans of beer per day, she was also drinking wine at times reports she would finish a box of wine within 2 days. her last drink was yesterday morning (isopropyl alcohol) which is why patient was previously hospitalized. patient reports having since gotten rid of the isopropyl alcohol from the house and does not have access to this. \par \par in the past she has been successful with attending [facility], psychiatry f/u monthly, attending meets/ counseling to sustain sobriety in addition to use of Naltrexone which she has tolerated well in the past. however reports [facility] had referred her back to [facility] for detox and patient never returned to their care. she is now attending private counseling for substance use disorder with Monique Wilkes. \par \par today patient reports nervousness, lightheadedness, fatigue, nausea started within the last 24 hours since discontinuing alcohol use, she has not initiated Naltrexone given she did not provide NP with complete information regarding the severity and duration of her relapse and had understood the dangers of discontinuing alcohol use abruptly and waited to notify NP today of these concerns. \par \par will evaluate and manage as appropriate.\par \par

## 2023-01-12 NOTE — ASSESSMENT
[FreeTextEntry1] : Padmaja Bowers is a 68yo patient of Dr. Reyes here for f/u of the above listed concerns. She p/w nervousness, lightheadedness, fatigue, nausea in setting tachycardia and visible tremors, symptoms likely 2/2 alcohol withdrawal, patient is at risk for worsening syndrome given duration and quantity of alcohol use since relapsing in 07/22. I am recommending immediate inpatient care for safe titration off of alcohol and monitoring of withdrawal symptoms- mgmt as appropriate. currently she denies neurological concerns. \par \par report called to [facility] ED, triage personnel verbalized understanding and awaiting patient arrival. \par \par patient refuses EMS transport, her  is present and will drive her to facility. counseled patient on dangers a/w alcohol withdrawal if left unmanaged including and not limited to auditory/ visual hallucinations, seizures, and death, patient verbalized understanding and agrees to immediately go to [facility] as discussed. \par \par patient to f/u with PCP pending discharge from [facility]. \par \par

## 2023-01-12 NOTE — PHYSICAL EXAM
[No Acute Distress] : no acute distress [Normal Sclera/Conjunctiva] : normal sclera/conjunctiva [PERRL] : pupils equal round and reactive to light [No Respiratory Distress] : no respiratory distress  [No Accessory Muscle Use] : no accessory muscle use [Clear to Auscultation] : lungs were clear to auscultation bilaterally [Regular Rhythm] : with a regular rhythm [Normal S1, S2] : normal S1 and S2 [No Edema] : there was no peripheral edema [Soft] : abdomen soft [Non Tender] : non-tender [Non-distended] : non-distended [Normal Bowel Sounds] : normal bowel sounds [Normal Posterior Cervical Nodes] : no posterior cervical lymphadenopathy [Normal Anterior Cervical Nodes] : no anterior cervical lymphadenopathy [Person] : oriented to person [Place] : oriented to place [Time] : oriented to time [Anxious] : anxious [Impaired judgment] : intact judgment [Impaired Insight] : intact insight [Normal Rate] : a normal rate [Normal Rhythm] : a normal rhythm [Normal Tone] : normal tone [Normal Volume] : normal volume [Normal] : normal throught processes [Normal Associations] :  no deficiency [Delusions] : exhibited no delusions [Hallucinations] : exhibited no hallucinations [Obsessions] : denied obsessions [Preoccupation with Violence] : denied preoccupation with violent thoughts [Suicidal Ideation] : denied suicidal ideation [Suicidal Intent] : denied suicidal intent [Suicidal Plan] : denied suicidal plans [Homicidal Ideation] : denied homicidal ideation [Homicidal Intent] : denied homicidal intention [Homicidal Plan] : denied homicidal plans [de-identified] : tachycardia noted [de-identified] : visible tremors noted

## 2023-01-19 ENCOUNTER — FORM ENCOUNTER (OUTPATIENT)
Age: 68
End: 2023-01-19

## 2023-01-30 ENCOUNTER — NON-APPOINTMENT (OUTPATIENT)
Age: 68
End: 2023-01-30

## 2023-02-02 ENCOUNTER — APPOINTMENT (OUTPATIENT)
Dept: FAMILY MEDICINE | Facility: CLINIC | Age: 68
End: 2023-02-02

## 2023-02-12 ENCOUNTER — NON-APPOINTMENT (OUTPATIENT)
Age: 68
End: 2023-02-12

## 2023-02-12 ENCOUNTER — FORM ENCOUNTER (OUTPATIENT)
Age: 68
End: 2023-02-12

## 2023-02-13 ENCOUNTER — TRANSCRIPTION ENCOUNTER (OUTPATIENT)
Age: 68
End: 2023-02-13

## 2023-02-15 ENCOUNTER — APPOINTMENT (OUTPATIENT)
Dept: GASTROENTEROLOGY | Facility: AMBULATORY MEDICAL SERVICES | Age: 68
End: 2023-02-15
Payer: MEDICARE

## 2023-02-15 PROCEDURE — 45378 DIAGNOSTIC COLONOSCOPY: CPT | Mod: GC

## 2023-02-23 ENCOUNTER — APPOINTMENT (OUTPATIENT)
Dept: FAMILY MEDICINE | Facility: CLINIC | Age: 68
End: 2023-02-23

## 2023-02-27 ENCOUNTER — NON-APPOINTMENT (OUTPATIENT)
Age: 68
End: 2023-02-27

## 2023-02-27 ENCOUNTER — RX RENEWAL (OUTPATIENT)
Age: 68
End: 2023-02-27

## 2023-02-27 ENCOUNTER — APPOINTMENT (OUTPATIENT)
Dept: FAMILY MEDICINE | Facility: CLINIC | Age: 68
End: 2023-02-27
Payer: MEDICARE

## 2023-02-27 VITALS
OXYGEN SATURATION: 95 % | DIASTOLIC BLOOD PRESSURE: 82 MMHG | HEIGHT: 62 IN | SYSTOLIC BLOOD PRESSURE: 132 MMHG | BODY MASS INDEX: 29.44 KG/M2 | WEIGHT: 160 LBS | TEMPERATURE: 97 F | HEART RATE: 84 BPM

## 2023-02-27 PROCEDURE — 99213 OFFICE O/P EST LOW 20 MIN: CPT

## 2023-02-27 NOTE — HISTORY OF PRESENT ILLNESS
[FreeTextEntry8] : Patient presents with an eye stye. Her symptoms started Saturday; eye redness, eye swelling, and runny nose due to irritation. She states that she had originally experienced symptoms in both of her eyes but it has now settled solely in her right eye. She denies any sinus pain, earache, fever, any other signs of infection, and having had a stye in the past. She adds that she had a kid visit her house who presented with a stye and she believes she possibly could have caught the infection from him. \par \par

## 2023-02-27 NOTE — HEALTH RISK ASSESSMENT
[No falls in past year] : Patient reported no falls in the past year [0] : 2) Feeling down, depressed, or hopeless: Not at all (0) [PHQ-2 Negative - No further assessment needed] : PHQ-2 Negative - No further assessment needed [[id number]] : 0 [Never] : Never

## 2023-02-27 NOTE — PHYSICAL EXAM
[PERRL] : pupils equal round and reactive to light [EOMI] : extraocular movements intact [No Respiratory Distress] : no respiratory distress  [No Focal Deficits] : no focal deficits [Normal] : affect was normal and insight and judgment were intact [de-identified] : redness and swelling under right eye, stye in lower right eyelid, discharge

## 2023-02-27 NOTE — PLAN
[FreeTextEntry1] : Will prescribe a topical antibiotic. Informed patient to call the office if symptoms do not improve over the next couple of days; will prescribe an oral antibiotic at that time due to the swelling under her eye. Advised to apply a warm compress on the eye to reduce inflammation and a cool compress to reduce pain; recommended to alternate between the two as needed.

## 2023-05-22 ENCOUNTER — APPOINTMENT (OUTPATIENT)
Dept: FAMILY MEDICINE | Facility: CLINIC | Age: 68
End: 2023-05-22

## 2023-05-22 DIAGNOSIS — E78.2 MIXED HYPERLIPIDEMIA: ICD-10-CM

## 2023-05-22 DIAGNOSIS — Z00.00 ENCOUNTER FOR GENERAL ADULT MEDICAL EXAMINATION W/OUT ABNORMAL FINDINGS: ICD-10-CM

## 2023-05-22 DIAGNOSIS — F19.90 OTHER PSYCHOACTIVE SUBSTANCE USE, UNSPECIFIED, UNCOMPLICATED: ICD-10-CM

## 2023-05-22 DIAGNOSIS — M85.80 OTHER SPECIFIED DISORDERS OF BONE DENSITY AND STRUCTURE, UNSPECIFIED SITE: ICD-10-CM

## 2023-06-05 ENCOUNTER — TRANSCRIPTION ENCOUNTER (OUTPATIENT)
Age: 68
End: 2023-06-05

## 2023-06-13 ENCOUNTER — NON-APPOINTMENT (OUTPATIENT)
Age: 68
End: 2023-06-13

## 2023-06-16 ENCOUNTER — NON-APPOINTMENT (OUTPATIENT)
Age: 68
End: 2023-06-16

## 2023-06-22 ENCOUNTER — APPOINTMENT (OUTPATIENT)
Dept: ORTHOPEDIC SURGERY | Facility: CLINIC | Age: 68
End: 2023-06-22
Payer: MEDICARE

## 2023-06-22 VITALS
WEIGHT: 160 LBS | SYSTOLIC BLOOD PRESSURE: 117 MMHG | BODY MASS INDEX: 29.44 KG/M2 | DIASTOLIC BLOOD PRESSURE: 72 MMHG | HEART RATE: 76 BPM | HEIGHT: 62 IN

## 2023-06-22 PROCEDURE — 73030 X-RAY EXAM OF SHOULDER: CPT | Mod: RT

## 2023-06-22 PROCEDURE — 20610 DRAIN/INJ JOINT/BURSA W/O US: CPT | Mod: RT

## 2023-06-22 PROCEDURE — 99203 OFFICE O/P NEW LOW 30 MIN: CPT | Mod: 25

## 2023-06-23 ENCOUNTER — OFFICE (OUTPATIENT)
Dept: URBAN - METROPOLITAN AREA CLINIC 102 | Facility: CLINIC | Age: 68
Setting detail: OPHTHALMOLOGY
End: 2023-06-23
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
## 2023-06-23 PROCEDURE — NO SHOW FE NO SHOW FEE: Performed by: OPHTHALMOLOGY

## 2023-07-03 NOTE — ADDENDUM
[FreeTextEntry1] : This note was written by Nghia Szymanski on 06/28/2023, acting as a scribe for Woodrow Alvarez III, MD

## 2023-07-03 NOTE — CONSULT LETTER
[Dear  ___] : Dear  [unfilled], [Consult Letter:] : I had the pleasure of evaluating your patient, [unfilled]. [Please see my note below.] : Please see my note below. [Consult Closing:] : Thank you very much for allowing me to participate in the care of this patient.  If you have any questions, please do not hesitate to contact me. [Sincerely,] : Sincerely, [FreeTextEntry3] : Woodrow Alvarez III, MD \par BELIA/robbin\par

## 2023-07-03 NOTE — PHYSICAL EXAM
[Normal] : Gait: normal [de-identified] : Left Shoulder: \par Shoulder: Range of Motion in Degrees:   	\par    	                        Claimant:  	Normal:  	\par Abduction (Active)  	180  	180 degrees  	\par Abduction (Passive)  	180  	180 degrees  	\par Forward elevation (Active):  	180  	180 degrees  	\par Forward elevation (Passive):  180  	180 degrees  	\par External rotation (Active):  	45  	45 degrees  	\par External rotation (Passive):  	45  	45 degrees  	\par Internal rotation (Active):  	L-1  	L-1  	\par Internal rotation (Passive):  	L-1  	L-1  	\par \par No motor weakness to internal rotation, external rotation or abduction in the scapular plane.  Negative crank test.  Negative O’Joel’s test.  Negative Speed’s test. Negative Yergason’s test.  Negative cross arm test.  No tenderness to palpation at the AC joint. Negative Hawkin’s sign.  Negative Neer’s sign.  Negative apprehension. Negative sulcus sign.  No gross neurological or vascular deficits distally.  Skin is intact.  No rashes, scars or lesions.  2+ radial and ulnar pulses. No extra-articular swelling or tenderness.\par \par Right Shoulder: Range of Motion in Degrees:	\par 	                                  Claimant:	Normal:	\par Abduction (Active)	                  180	               180 degrees	\par Abduction (Passive)	  180	               180 degrees	\par Forward elevation (Active):	  180	               180 degrees	\par Forward elevation (Passive):	  180	               180 degrees	\par External rotation (Active):	   45	               45 degrees	\par External rotation (Passive):	   45	               45 degrees	\par Internal rotation (Active):	   L-1	               L-1	\par Internal rotation (Passive):	   L-1	               L-1	\par  \par No motor weakness to internal rotation, external rotation or abduction in the scapular plane.  Negative crank test.  Negative O’Joel’s test.  Negative Speed’s test. Negative Yergason’s test.  Positive cross arm test.  Positive tenderness to palpation over the AC joint. Positive Hawkin’s sign.  Positive Neer’s sign. Negative apprehension. Negative sulcus sign.  No gross neurological or vascular deficits distally.  Skin is intact.  No rashes, scars or lesions. 2+ radial and ulnar pulses. No extra-articular swelling or tenderness.\par   [de-identified] : Appearance:  Well developed, well-nourished female in no acute distress.\par   [de-identified] : Radiographs, two views of the right shoulder taken in the office today, show mild degenerative changes at the AC joint.\par

## 2023-07-03 NOTE — DISCUSSION/SUMMARY
[de-identified] : At this time, I recommended ice and elevation for the right shoulder impingement syndrome and AC arthritis.  She will be reassessed in 1 week for possible AC joint injection.\par

## 2023-07-03 NOTE — HISTORY OF PRESENT ILLNESS
[de-identified] : The patient comes in today with complaints of pain to her right shoulder.  She states it has been going on for several weeks and getting a little worse recently.

## 2023-07-03 NOTE — PROCEDURE
[de-identified] : \par Indication:\par Impingement syndrome of right shoulder\par AC arthritis of right shoulder\par \par Consent: \par At this time, I have recommended a subacromial injection to the right shoulder.  The risks and benefits of the procedure were discussed with the patient in detail.  Upon verbal consent of the patient, we proceeded with the injection as noted below.  \par \par Description of Procedure:  \par After a sterile prep, the patient underwent a subacromial injection of approximately 9 mL of 1% Lidocaine 10 mg/mL without epinephrine and 1 mL of Kenalog (40 mg/mL) into the right shoulder.  The patient tolerated the procedure well.  There were no complications. \par \par : [employer]par Drug Name: Triamcinolone Acetonide Injectable Suspension USP\par NDC#: 0143-9577-10\par Lot#:   [id number]\par Expiration Date: 01/2024\par \par \par

## 2023-07-13 ENCOUNTER — APPOINTMENT (OUTPATIENT)
Dept: ORTHOPEDIC SURGERY | Facility: CLINIC | Age: 68
End: 2023-07-13
Payer: MEDICARE

## 2023-07-13 VITALS
HEIGHT: 62 IN | BODY MASS INDEX: 29.44 KG/M2 | SYSTOLIC BLOOD PRESSURE: 118 MMHG | DIASTOLIC BLOOD PRESSURE: 74 MMHG | WEIGHT: 160 LBS | HEART RATE: 73 BPM

## 2023-07-13 PROCEDURE — 99213 OFFICE O/P EST LOW 20 MIN: CPT

## 2023-07-17 ENCOUNTER — APPOINTMENT (OUTPATIENT)
Dept: FAMILY MEDICINE | Facility: CLINIC | Age: 68
End: 2023-07-17
Payer: MEDICARE

## 2023-07-17 VITALS
SYSTOLIC BLOOD PRESSURE: 148 MMHG | OXYGEN SATURATION: 99 % | WEIGHT: 165 LBS | TEMPERATURE: 97.2 F | HEIGHT: 62 IN | BODY MASS INDEX: 30.36 KG/M2 | HEART RATE: 76 BPM | DIASTOLIC BLOOD PRESSURE: 100 MMHG

## 2023-07-17 DIAGNOSIS — F10.939 ALCOHOL USE, UNSPECIFIED WITH WITHDRAWAL, UNSPECIFIED: ICD-10-CM

## 2023-07-17 DIAGNOSIS — J31.0 CHRONIC RHINITIS: ICD-10-CM
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
## 2023-07-17 DIAGNOSIS — Z86.59 PERSONAL HISTORY OF OTHER MENTAL AND BEHAVIORAL DISORDERS: ICD-10-CM
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
## 2023-07-17 DIAGNOSIS — R13.10 DYSPHAGIA, UNSPECIFIED: ICD-10-CM

## 2023-07-17 DIAGNOSIS — B00.9 HERPESVIRAL INFECTION, UNSPECIFIED: ICD-10-CM

## 2023-07-17 DIAGNOSIS — H00.012 HORDEOLUM EXTERNUM RIGHT LOWER EYELID: ICD-10-CM

## 2023-07-17 DIAGNOSIS — Z87.898 PERSONAL HISTORY OF OTHER SPECIFIED CONDITIONS: ICD-10-CM

## 2023-07-17 DIAGNOSIS — Z00.00 ENCOUNTER FOR GENERAL ADULT MEDICAL EXAMINATION W/OUT ABNORMAL FINDINGS: ICD-10-CM

## 2023-07-17 DIAGNOSIS — S30.867A: ICD-10-CM

## 2023-07-17 DIAGNOSIS — F10.21 ALCOHOL DEPENDENCE, IN REMISSION: ICD-10-CM

## 2023-07-17 PROCEDURE — G0439: CPT

## 2023-07-17 PROCEDURE — 36415 COLL VENOUS BLD VENIPUNCTURE: CPT

## 2023-07-17 RX ORDER — NALTREXONE HYDROCHLORIDE 50 MG/1
50 TABLET, FILM COATED ORAL DAILY
Refills: 0 | Status: ACTIVE | COMMUNITY
Start: 2023-07-17

## 2023-07-17 RX ORDER — PHENAZOPYRIDINE 200 MG/1
200 TABLET, FILM COATED ORAL 3 TIMES DAILY
Qty: 6 | Refills: 0 | Status: DISCONTINUED | COMMUNITY
Start: 2022-04-29 | End: 2023-07-17

## 2023-07-17 RX ORDER — CIPROFLOXACIN 3 MG/ML
0.3 SOLUTION OPHTHALMIC 4 TIMES DAILY
Qty: 1 | Refills: 0 | Status: DISCONTINUED | COMMUNITY
Start: 2023-02-27 | End: 2023-07-17

## 2023-07-17 RX ORDER — NITROFURANTOIN MACROCRYSTALS 100 MG/1
100 CAPSULE ORAL TWICE DAILY
Qty: 10 | Refills: 0 | Status: DISCONTINUED | COMMUNITY
Start: 2022-04-29 | End: 2023-07-17

## 2023-07-17 RX ORDER — ALPRAZOLAM 0.5 MG/1
0.5 TABLET ORAL
Qty: 30 | Refills: 0 | Status: DISCONTINUED | COMMUNITY
Start: 2021-06-08 | End: 2023-07-17

## 2023-07-17 RX ORDER — FLUTICASONE PROPIONATE 50 UG/1
50 SPRAY, METERED NASAL DAILY
Qty: 1 | Refills: 0 | Status: DISCONTINUED | COMMUNITY
Start: 2021-11-17 | End: 2023-07-17

## 2023-07-17 NOTE — HEALTH RISK ASSESSMENT
[No] : In the past 12 months have you used drugs other than those required for medical reasons? No [0] : 2) Feeling down, depressed, or hopeless: Not at all (0) [PHQ-2 Negative - No further assessment needed] : PHQ-2 Negative - No further assessment needed [Never] : Never [Fully functional (bathing, dressing, toileting, transferring, walking, feeding)] : Fully functional (bathing, dressing, toileting, transferring, walking, feeding) [Fully functional (using the telephone, shopping, preparing meals, housekeeping, doing laundry, using] : Fully functional and needs no help or supervision to perform IADLs (using the telephone, shopping, preparing meals, housekeeping, doing laundry, using transportation, managing medications and managing finances) [Audit-CScore] : 0 [[id number]] : 0 [Reports changes in vision] : Reports no changes in vision [Reports changes in dental health] : Reports no changes in dental health [MammogramDate] : 6/2023 [BoneDensityDate] : 04/22 [ColonoscopyDate] : 05/23 [de-identified] : UTD dentist, vision
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
## 2023-07-17 NOTE — ASSESSMENT
[FreeTextEntry1] : I Leydi Phillip am scribing for the presence of Dr. Garrett the following sections HISTORY OF PRESENT ILLNESS, PAST MEDICAL/FAMILY/SOCIAL HISTORY; REVIEW OF SYSTEMS; VITAL SIGNS; PHYSICAL EXAM. \par \par  I personally performed the services described in the documentation, reviewed the documentation recorded by the scribe in my presence and it accurately and completely records my words and actions.\par

## 2023-07-18 LAB
ALBUMIN SERPL ELPH-MCNC: 4.7 G/DL
ALP BLD-CCNC: 41 U/L
ALT SERPL-CCNC: 23 U/L
ANION GAP SERPL CALC-SCNC: 11 MMOL/L
AST SERPL-CCNC: 14 U/L
BILIRUB SERPL-MCNC: 1.6 MG/DL
BUN SERPL-MCNC: 19 MG/DL
CALCIUM SERPL-MCNC: 9.9 MG/DL
CHLORIDE SERPL-SCNC: 98 MMOL/L
CHOLEST SERPL-MCNC: 208 MG/DL
CO2 SERPL-SCNC: 24 MMOL/L
CREAT SERPL-MCNC: 0.95 MG/DL
EGFR: 66 ML/MIN/1.73M2
GLUCOSE SERPL-MCNC: 123 MG/DL
HDLC SERPL-MCNC: 96 MG/DL
LDLC SERPL CALC-MCNC: 93 MG/DL
NONHDLC SERPL-MCNC: 111 MG/DL
POTASSIUM SERPL-SCNC: 4.8 MMOL/L
PROT SERPL-MCNC: 6.9 G/DL
SODIUM SERPL-SCNC: 134 MMOL/L
TRIGL SERPL-MCNC: 107 MG/DL
TSH SERPL-ACNC: 1.63 UIU/ML

## 2023-07-18 NOTE — DISCUSSION/SUMMARY
[de-identified] : At this time, due to impingement with AC arthritis of the right shoulder, I recommend she start on a course of therapy. She will be reassessed in four to six weeks. Of note, because of the chronic cervical issue, she is referred for a spine evaluation.

## 2023-07-18 NOTE — ADDENDUM
[FreeTextEntry1] : This note was written by Vivienne Morales on 07/18/2023 acting as a scribe for OBEY PIPER III, MD

## 2023-07-18 NOTE — PHYSICAL EXAM
[de-identified] : Right Shoulder:  \par Range of Motion in Degrees:	\par 	                                  Claimant:	Normal:	\par Abduction (Active)	                  180	               180 degrees	\par Abduction (Passive)	  180	               180 degrees	\par Forward elevation (Active):	  180	               180 degrees	\par Forward elevation (Passive):	  180	               180 degrees	\par External rotation (Active):	   45	               45 degrees	\par External rotation (Passive):	   45	               45 degrees	\par Internal rotation (Active):	   L-1	               L-1	\par Internal rotation (Passive):	   L-1	               L-1	\par  \par No motor weakness to internal rotation, external rotation or abduction in the scapular plane.  Negative crank test.  Negative O’Joel’s test.  Negative Speed’s test. Negative Yergason’s test. Positive cross arm test. Positive tenderness to palpation at the AC joint. Positive Hawkin’s sign.  Positive Neer’s sign. Negative apprehension. Negative sulcus sign.  No gross neurological or vascular deficits distally.  Skin is intact.  No rashes, scars or lesions. 2+ radial and ulnar pulses. No extra-articular swelling or tenderness.\par   [de-identified] : Ambulating with a normal gait. [de-identified] : Appearance:  Well-developed, well-nourished female in no acute distress.\par

## 2023-07-18 NOTE — HISTORY OF PRESENT ILLNESS
[de-identified] : The patient comes in today for her right shoulder. She states she feels much better. Her pain is down to a level 4.

## 2023-07-18 NOTE — CONSULT LETTER
[Dear  ___] : Dear  [unfilled], [FreeTextEntry1] : \par I am referring Padmaja Bowers to you for further evaluation. \par \par Thank you very much for seeing this patient. My most recent\par evaluation follows.\par \par If you have any questions, please do not hesitate to contact \par me.\par \par Sincerely,\par \par \par Woodrow Alvarez III, MD\par [facility]/sg

## 2023-07-28 ENCOUNTER — NON-APPOINTMENT (OUTPATIENT)
Age: 68
End: 2023-07-28

## 2023-07-31 ENCOUNTER — APPOINTMENT (OUTPATIENT)
Dept: NEUROSURGERY | Facility: CLINIC | Age: 68
End: 2023-07-31

## 2023-08-04 ENCOUNTER — TRANSCRIPTION ENCOUNTER (OUTPATIENT)
Age: 68
End: 2023-08-04

## 2023-08-21 ENCOUNTER — RX RENEWAL (OUTPATIENT)
Age: 68
End: 2023-08-21

## 2023-08-24 ENCOUNTER — APPOINTMENT (OUTPATIENT)
Dept: ORTHOPEDIC SURGERY | Facility: CLINIC | Age: 68
End: 2023-08-24
Payer: MEDICARE

## 2023-08-24 VITALS
HEIGHT: 62 IN | BODY MASS INDEX: 30.36 KG/M2 | SYSTOLIC BLOOD PRESSURE: 138 MMHG | HEART RATE: 73 BPM | WEIGHT: 165 LBS | DIASTOLIC BLOOD PRESSURE: 88 MMHG

## 2023-08-24 DIAGNOSIS — M19.011 PRIMARY OSTEOARTHRITIS, RIGHT SHOULDER: ICD-10-CM

## 2023-08-24 DIAGNOSIS — M75.41 IMPINGEMENT SYNDROME OF RIGHT SHOULDER: ICD-10-CM

## 2023-08-24 PROCEDURE — 99212 OFFICE O/P EST SF 10 MIN: CPT

## 2023-08-28 PROBLEM — M19.011 ARTHRITIS OF RIGHT ACROMIOCLAVICULAR JOINT: Status: ACTIVE | Noted: 2023-07-03

## 2023-08-28 PROBLEM — M75.41 IMPINGEMENT SYNDROME OF RIGHT SHOULDER: Status: ACTIVE | Noted: 2023-06-22

## 2023-08-28 NOTE — ADDENDUM
[FreeTextEntry1] : This note was written by Nghia Szymanski on 08/28/2023, acting as a scribe for Woodrow Alvarez III, MD

## 2023-08-28 NOTE — HISTORY OF PRESENT ILLNESS
[de-identified] : The patient comes in today for her right shoulder.  She states she still has some pain, but she is much better.

## 2023-08-28 NOTE — DISCUSSION/SUMMARY
[de-identified] : At this time, due to right shoulder impingement syndrome and AC arthritis, she was instructed on home therapeutic modalities and to follow up with me on an as needed basis.

## 2023-08-28 NOTE — PHYSICAL EXAM
[Normal] : Gait: normal [de-identified] : Right Shoulder: Range of Motion in Degrees:	 	                                  Claimant:	Normal:	 Abduction (Active)	                  180	               180 degrees	 Abduction (Passive)	  180	               180 degrees	 Forward elevation (Active):	  180	               180 degrees	 Forward elevation (Passive):	  180	               180 degrees	 External rotation (Active):	   45	               45 degrees	 External rotation (Passive):	   45	               45 degrees	 Internal rotation (Active):	   L-1	               L-1	 Internal rotation (Passive):	   L-1	               L-1	   No motor weakness to internal rotation, external rotation or abduction in the scapular plane.  Negative crank test.  Negative [place]'s test.  Negative Speeds test. Negative Yergason's test.  Positive cross arm test.  Positive tenderness to palpation over the AC joint. Positive Cannon sign.  Positive Neers sign. Negative apprehension. Negative sulcus sign.  No gross neurological or vascular deficits distally.  Skin is intact.  No rashes, scars or lesions. 2+ radial and ulnar pulses. No extra-articular swelling or tenderness.   [de-identified] : Appearance:  Well-developed, well-nourished female in no acute distress.

## 2023-10-02 ENCOUNTER — APPOINTMENT (OUTPATIENT)
Dept: FAMILY MEDICINE | Facility: CLINIC | Age: 68
End: 2023-10-02
Payer: MEDICARE

## 2023-10-02 VITALS
DIASTOLIC BLOOD PRESSURE: 74 MMHG | OXYGEN SATURATION: 98 % | BODY MASS INDEX: 31.65 KG/M2 | TEMPERATURE: 99.2 F | WEIGHT: 172 LBS | SYSTOLIC BLOOD PRESSURE: 110 MMHG | HEIGHT: 62 IN | HEART RATE: 67 BPM

## 2023-10-02 DIAGNOSIS — Z23 ENCOUNTER FOR IMMUNIZATION: ICD-10-CM

## 2023-10-02 DIAGNOSIS — J01.00 ACUTE MAXILLARY SINUSITIS, UNSPECIFIED: ICD-10-CM

## 2023-10-02 PROCEDURE — 99213 OFFICE O/P EST LOW 20 MIN: CPT

## 2023-10-19 ENCOUNTER — EMERGENCY (EMERGENCY)
Facility: HOSPITAL | Age: 68
LOS: 0 days | Discharge: HOME | End: 2023-10-19
Attending: STUDENT IN AN ORGANIZED HEALTH CARE EDUCATION/TRAINING PROGRAM
Payer: MEDICARE

## 2023-10-19 VITALS
RESPIRATION RATE: 18 BRPM | SYSTOLIC BLOOD PRESSURE: 121 MMHG | TEMPERATURE: 98 F | HEART RATE: 66 BPM | OXYGEN SATURATION: 99 % | DIASTOLIC BLOOD PRESSURE: 72 MMHG

## 2023-10-19 VITALS
DIASTOLIC BLOOD PRESSURE: 70 MMHG | HEIGHT: 62 IN | OXYGEN SATURATION: 98 % | HEART RATE: 74 BPM | SYSTOLIC BLOOD PRESSURE: 138 MMHG | WEIGHT: 169.09 LBS | TEMPERATURE: 98 F | RESPIRATION RATE: 18 BRPM

## 2023-10-19 DIAGNOSIS — Z98.890 OTHER SPECIFIED POSTPROCEDURAL STATES: ICD-10-CM

## 2023-10-19 DIAGNOSIS — F10.129 ALCOHOL ABUSE WITH INTOXICATION, UNSPECIFIED: ICD-10-CM

## 2023-10-19 DIAGNOSIS — Z98.890 OTHER SPECIFIED POSTPROCEDURAL STATES: Chronic | ICD-10-CM

## 2023-10-19 DIAGNOSIS — E78.5 HYPERLIPIDEMIA, UNSPECIFIED: ICD-10-CM

## 2023-10-19 DIAGNOSIS — I10 ESSENTIAL (PRIMARY) HYPERTENSION: ICD-10-CM

## 2023-10-19 DIAGNOSIS — F41.9 ANXIETY DISORDER, UNSPECIFIED: ICD-10-CM

## 2023-10-19 DIAGNOSIS — F32.A DEPRESSION, UNSPECIFIED: ICD-10-CM

## 2023-10-19 LAB
GLUCOSE BLDC GLUCOMTR-MCNC: 94 MG/DL — SIGNIFICANT CHANGE UP (ref 70–99)
GLUCOSE BLDC GLUCOMTR-MCNC: 94 MG/DL — SIGNIFICANT CHANGE UP (ref 70–99)

## 2023-10-19 PROCEDURE — 99285 EMERGENCY DEPT VISIT HI MDM: CPT

## 2023-10-19 PROCEDURE — 82962 GLUCOSE BLOOD TEST: CPT

## 2023-10-19 PROCEDURE — 99283 EMERGENCY DEPT VISIT LOW MDM: CPT
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
## 2023-10-19 NOTE — ED PROVIDER NOTE - NSICDXPASTSURGICALHX_GEN_ALL_CORE_FT
PAST SURGICAL HISTORY:   delivery delivered  &     History of arthroscopy of left knee 2017    S/P LASIK surgery of both eyes 2013    Status post lateral meniscus repair     Status post tendon repair Left Foot as a teenager
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
## 2023-10-19 NOTE — ED PROVIDER NOTE - PRO INTERPRETER NEED 2
sepsis due to abdominal cellulitis / suspected abscess sepsis due to abdominal cellulitis / suspected abscess English
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
## 2023-10-19 NOTE — ED PROVIDER NOTE - NSFOLLOWUPINSTRUCTIONS_ED_ALL_ED_FT
CRISPIN    Caller: Jacque Francisco     Medication Name and Dosage: valsartan (DIOVAN) 320 MG     Medication amount left: 2 Days     Preferred Pharmacy: [facility]- On File      Other questions (Topic): N/A    Callback Number (Will only call for issues): [phone] (home)      Thank you,    Lloyd AKERS     Alcohol Use Disorder    WHAT YOU NEED TO KNOW:    Alcohol use disorder (AUD) is problem drinking. AUD includes alcohol abuse and alcohol dependence. Alcohol can damage your brain, heart, kidneys, lungs, and liver. Your risk of stroke is greater if you have 5 or more drinks each day. If you are pregnant, you and your baby are at risk for serious health problems. No amount of alcohol is safe during pregnancy.    DISCHARGE INSTRUCTIONS:    Call your local emergency number (911 in the [place]) if:     You have seizures.        Call your doctor if:     Your heart is beating faster than usual.      You have hallucinations.      You cannot remember what happens while you are drinking.      You are anxious and have nausea.      Your hands are shaky and you are sweating heavily.      You have questions or concerns about your condition or care.    Follow up with your healthcare provider as directed: Do not try to stop drinking on your own. Your healthcare provider may need to help you withdraw from alcohol safely. He or she may need to admit you to the hospital. You may also need any of the following:    Medicines to decrease your craving for alcohol      Support groups such as Alcoholics Anonymous       Therapy from a psychiatrist or psychologist      Admission to an inpatient facility for treatment for severe AUD    For support and more information:     Substance Abuse and Mental Health Services Administration  [address]  [place] [address]  Web Address: http://www.samhsa.gov      Alcoholics Anonymous  Web Address: http://www.aa.org

## 2023-10-19 NOTE — ED PROVIDER NOTE - PATIENT PORTAL LINK FT
You can access the FollowMyHealth Patient Portal offered by [facility] by registering at the following website: [email or web address]. By joining [facility]’s FollowMyHealth portal, you will also be able to view your health information using other applications (apps) compatible with our system.

## 2023-10-19 NOTE — ED PROVIDER NOTE - NSICDXPASTMEDICALHX_GEN_ALL_CORE_FT
PAST MEDICAL HISTORY:  Anxiety with depression     Essential hypertension     Hyperlipemia       Alcohol abuse

## 2023-10-19 NOTE — ED ADULT NURSE NOTE - OBJECTIVE STATEMENT
Pt in ED c/o EtOH intoxication.  Pt breathing symmetrical and unlabored.  Pt in no acute distress at this time.
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
## 2023-10-19 NOTE — ED PROVIDER NOTE - CLINICAL SUMMARY MEDICAL DECISION MAKING FREE TEXT BOX
67 y/o female with alcohol intoxication, denies SI or HI. Reports that she feels safe at home. Pt to call  at this time to assume care. 69 y/o female with alcohol intoxication, denies SI or HI. Reports that she feels safe at home. Pt to call  at this time to assume care.    Donna DO: patient evaluated by SW; resources provided; patient ambulatory in the ED with steady gait; clinically sober;  at bedside; to assume care; will f/u as instructed; strict return precautions given.

## 2023-10-19 NOTE — ED ADULT NURSE NOTE - NSFALLRISKINTERV_ED_ALL_ED

## 2024-02-01 ENCOUNTER — TRANSCRIPTION ENCOUNTER (OUTPATIENT)
Age: 69
End: 2024-02-01

## 2024-02-19 PROBLEM — F10.10 ALCOHOL ABUSE, UNCOMPLICATED: Chronic | Status: ACTIVE | Noted: 2023-10-19

## 2024-02-19 RX ORDER — VALACYCLOVIR 1 G/1
1 TABLET, FILM COATED ORAL TWICE DAILY
Qty: 30 | Refills: 3 | Status: ACTIVE | COMMUNITY
Start: 1900-01-01 | End: 1900-01-01

## 2024-02-19 RX ORDER — ACYCLOVIR 50 MG/G
5 OINTMENT TOPICAL 4 TIMES DAILY
Qty: 3 | Refills: 3 | Status: ACTIVE | COMMUNITY
Start: 2022-02-18 | End: 1900-01-01

## 2024-02-19 RX ORDER — MUPIROCIN 20 MG/G
2 OINTMENT TOPICAL 3 TIMES DAILY
Qty: 1 | Refills: 3 | Status: ACTIVE | COMMUNITY
Start: 2023-07-17 | End: 1900-01-01

## 2024-04-08 ENCOUNTER — APPOINTMENT (OUTPATIENT)
Dept: FAMILY MEDICINE | Facility: CLINIC | Age: 69
End: 2024-04-08

## 2024-04-17 ENCOUNTER — APPOINTMENT (OUTPATIENT)
Dept: FAMILY MEDICINE | Facility: CLINIC | Age: 69
End: 2024-04-17
Payer: MEDICARE

## 2024-04-17 VITALS
TEMPERATURE: 99.4 F | SYSTOLIC BLOOD PRESSURE: 122 MMHG | DIASTOLIC BLOOD PRESSURE: 72 MMHG | OXYGEN SATURATION: 99 % | WEIGHT: 181 LBS | HEIGHT: 62 IN | BODY MASS INDEX: 33.31 KG/M2 | HEART RATE: 64 BPM

## 2024-04-17 DIAGNOSIS — Z91.09 OTHER ALLERGY STATUS, OTHER THAN TO DRUGS AND BIOLOGICAL SUBSTANCES: ICD-10-CM

## 2024-04-17 DIAGNOSIS — I10 ESSENTIAL (PRIMARY) HYPERTENSION: ICD-10-CM

## 2024-04-17 PROCEDURE — G2211 COMPLEX E/M VISIT ADD ON: CPT

## 2024-04-17 PROCEDURE — 99214 OFFICE O/P EST MOD 30 MIN: CPT

## 2024-04-17 RX ORDER — ESCITALOPRAM OXALATE 10 MG/1
10 TABLET ORAL
Qty: 90 | Refills: 0 | Status: DISCONTINUED | COMMUNITY
End: 2024-04-17

## 2024-04-17 RX ORDER — LOSARTAN POTASSIUM 100 MG/1
100 TABLET, FILM COATED ORAL
Qty: 90 | Refills: 3 | Status: ACTIVE | COMMUNITY
Start: 2021-06-08 | End: 1900-01-01

## 2024-04-17 RX ORDER — ALBUTEROL SULFATE 90 UG/1
108 (90 BASE) INHALANT RESPIRATORY (INHALATION)
Qty: 1 | Refills: 0 | Status: ACTIVE | COMMUNITY
Start: 2024-04-17 | End: 1900-01-01

## 2024-04-17 RX ORDER — OMEGA-3-ACID ETHYL ESTERS CAPSULES 1 G/1
1 CAPSULE, LIQUID FILLED ORAL
Qty: 180 | Refills: 3 | Status: ACTIVE | COMMUNITY
Start: 2023-02-27 | End: 1900-01-01

## 2024-04-17 RX ORDER — ORPHENADRINE CITRATE 100 MG/1
100 TABLET, EXTENDED RELEASE ORAL TWICE DAILY
Qty: 30 | Refills: 0 | Status: DISCONTINUED | COMMUNITY
Start: 2023-07-17 | End: 2024-04-17

## 2024-04-17 RX ORDER — ATORVASTATIN CALCIUM 40 MG/1
40 TABLET, FILM COATED ORAL
Qty: 90 | Refills: 3 | Status: ACTIVE | COMMUNITY
Start: 2021-06-08 | End: 1900-01-01

## 2024-04-17 RX ORDER — IPRATROPIUM BROMIDE 21 UG/1
0.03 SPRAY NASAL
Qty: 1 | Refills: 3 | Status: ACTIVE | COMMUNITY
Start: 2022-03-22 | End: 1900-01-01

## 2024-04-17 RX ORDER — AMOXICILLIN AND CLAVULANATE POTASSIUM 875; 125 MG/1; MG/1
875-125 TABLET, COATED ORAL TWICE DAILY
Qty: 14 | Refills: 0 | Status: DISCONTINUED | COMMUNITY
Start: 2023-10-02 | End: 2024-04-17

## 2024-04-17 NOTE — PHYSICAL EXAM
[Normal] : normal rate, regular rhythm, normal S1 and S2 and no murmur heard [de-identified] : wheeze from trachea

## 2024-04-17 NOTE — HISTORY OF PRESENT ILLNESS
[de-identified] : Acute care visit Reports cough, raspy voice for about 3 weeks. Sneezing, itchy watery eyes. Went to [facility], symptoms resolved.  Came home starting coughing alot, PND, dark phlegm in the morning. heavy head, decreased energy. lightheaded.   Has been outside [employer]- gardening and walks 3miles a day

## 2024-04-17 NOTE — ASSESSMENT
[FreeTextEntry1] : CPE in july  This visit was conducted as part of ongoing, longitudinal medical care for the patient's chronic medical diagnoses and other issues.

## 2024-04-22 ENCOUNTER — APPOINTMENT (OUTPATIENT)
Dept: FAMILY MEDICINE | Facility: CLINIC | Age: 69
End: 2024-04-22
Payer: MEDICARE

## 2024-04-22 VITALS
BODY MASS INDEX: 33.13 KG/M2 | HEART RATE: 99 BPM | SYSTOLIC BLOOD PRESSURE: 118 MMHG | TEMPERATURE: 99.3 F | WEIGHT: 180 LBS | OXYGEN SATURATION: 98 % | HEIGHT: 62 IN | DIASTOLIC BLOOD PRESSURE: 62 MMHG

## 2024-04-22 DIAGNOSIS — R05.9 COUGH, UNSPECIFIED: ICD-10-CM

## 2024-04-22 PROCEDURE — 99213 OFFICE O/P EST LOW 20 MIN: CPT

## 2024-04-22 RX ORDER — ALBUTEROL SULFATE 90 UG/1
108 (90 BASE) INHALANT RESPIRATORY (INHALATION)
Qty: 1 | Refills: 3 | Status: ACTIVE | COMMUNITY
Start: 2024-04-22 | End: 1900-01-01

## 2024-04-22 RX ORDER — AZITHROMYCIN 250 MG/1
250 TABLET, FILM COATED ORAL
Qty: 1 | Refills: 0 | Status: ACTIVE | COMMUNITY
Start: 2024-04-22 | End: 1900-01-01

## 2024-04-22 NOTE — PHYSICAL EXAM
[No Respiratory Distress] : no respiratory distress  [No Focal Deficits] : no focal deficits [Normal] : affect was normal and insight and judgment were intact [de-identified] : diffuse rhonchi and expiratory wheezes, LLL crackles

## 2024-04-22 NOTE — HISTORY OF PRESENT ILLNESS
[FreeTextEntry8] : Patient complains of a productive cough for 2 weeks. She has had a fever for the past week. Her Tmax was 102.5.   She saw Dr. Garrett on 4/17 and was diagnosed with seasonal allergies as the cause of her cough. The patient feels this is something else though. She has fatigue, shortness of breath, and loss of appetite. She has a constant cough which is productive of green/brown sputum, she saw some blood in her sputum yesterday. She has been taking Coricidin which is all that Dr. DONIS recommended. She also tried Mucinex but developed abdominal pain and distention.

## 2024-04-22 NOTE — PLAN
[FreeTextEntry1] : Will treat for both bronchitis and pneumonia with antibiotics and an inhaler. If she is using albuterol regularly will add Symbicort or Advair as well.  Follow up if not improving. No need for chest X ray at this time but if the cough does not improve with the antibiotics and inhalers, will order this.

## 2024-04-22 NOTE — HEALTH RISK ASSESSMENT
[No falls in past year] : Patient reported no falls in the past year [0] : 2) Feeling down, depressed, or hopeless: Not at all (0) [PHQ-2 Negative - No further assessment needed] : PHQ-2 Negative - No further assessment needed [Never] : Never [[id number]] : 0

## 2024-04-22 NOTE — ASSESSMENT
[FreeTextEntry1] : She complains of a productive cough for two weeks and a fever for one week. She is not febrile in the office today but her temperature is higher than usual (99.3). It was similar last week when she saw Dr. DONIS but no comment was made about this. Her lung exam is consistent with bronchitis and possible pneumonia. It is difficult to her if there are LLL crackles due to the diffuse rhonchi and wheezing.

## 2024-04-29 ENCOUNTER — TRANSCRIPTION ENCOUNTER (OUTPATIENT)
Age: 69
End: 2024-04-29

## 2024-04-29 RX ORDER — DOXYCYCLINE HYCLATE 100 MG/1
100 CAPSULE ORAL
Qty: 20 | Refills: 0 | Status: ACTIVE | COMMUNITY
Start: 2024-04-29 | End: 1900-01-01

## 2024-08-16 ENCOUNTER — TRANSCRIPTION ENCOUNTER (OUTPATIENT)
Age: 69
End: 2024-08-16

## 2024-10-09 ENCOUNTER — NON-APPOINTMENT (OUTPATIENT)
Age: 69
End: 2024-10-09

## 2024-10-09 ENCOUNTER — APPOINTMENT (OUTPATIENT)
Dept: ORTHOPEDIC SURGERY | Facility: CLINIC | Age: 69
End: 2024-10-09

## 2024-10-09 VITALS
SYSTOLIC BLOOD PRESSURE: 118 MMHG | HEIGHT: 62 IN | DIASTOLIC BLOOD PRESSURE: 78 MMHG | WEIGHT: 180 LBS | HEART RATE: 88 BPM | BODY MASS INDEX: 33.13 KG/M2

## 2024-10-09 DIAGNOSIS — M17.0 BILATERAL PRIMARY OSTEOARTHRITIS OF KNEE: ICD-10-CM

## 2024-10-09 PROCEDURE — 73565 X-RAY EXAM OF KNEES: CPT | Mod: 59

## 2024-10-09 PROCEDURE — 73560 X-RAY EXAM OF KNEE 1 OR 2: CPT | Mod: 50

## 2024-10-09 PROCEDURE — 99213 OFFICE O/P EST LOW 20 MIN: CPT

## 2024-10-14 PROBLEM — M17.0 PRIMARY OSTEOARTHRITIS OF BOTH KNEES: Status: ACTIVE | Noted: 2024-10-09

## 2024-10-17 ENCOUNTER — APPOINTMENT (OUTPATIENT)
Dept: ORTHOPEDIC SURGERY | Facility: CLINIC | Age: 69
End: 2024-10-17

## 2024-11-06 ENCOUNTER — APPOINTMENT (OUTPATIENT)
Dept: ORTHOPEDIC SURGERY | Facility: CLINIC | Age: 69
End: 2024-11-06

## 2024-12-12 ENCOUNTER — RESULT CHARGE (OUTPATIENT)
Age: 69
End: 2024-12-12

## 2024-12-12 ENCOUNTER — APPOINTMENT (OUTPATIENT)
Dept: FAMILY MEDICINE | Facility: CLINIC | Age: 69
End: 2024-12-12
Payer: MEDICARE

## 2024-12-12 ENCOUNTER — NON-APPOINTMENT (OUTPATIENT)
Age: 69
End: 2024-12-12

## 2024-12-12 VITALS
DIASTOLIC BLOOD PRESSURE: 80 MMHG | BODY MASS INDEX: 34.04 KG/M2 | WEIGHT: 185 LBS | TEMPERATURE: 97.8 F | HEIGHT: 62 IN | HEART RATE: 84 BPM | OXYGEN SATURATION: 96 % | SYSTOLIC BLOOD PRESSURE: 120 MMHG

## 2024-12-12 DIAGNOSIS — E66.811 OBESITY, CLASS 1: ICD-10-CM

## 2024-12-12 DIAGNOSIS — I10 ESSENTIAL (PRIMARY) HYPERTENSION: ICD-10-CM

## 2024-12-12 DIAGNOSIS — F10.21 ALCOHOL DEPENDENCE, IN REMISSION: ICD-10-CM

## 2024-12-12 DIAGNOSIS — Z00.00 ENCOUNTER FOR GENERAL ADULT MEDICAL EXAMINATION W/OUT ABNORMAL FINDINGS: ICD-10-CM

## 2024-12-12 DIAGNOSIS — M85.80 OTHER SPECIFIED DISORDERS OF BONE DENSITY AND STRUCTURE, UNSPECIFIED SITE: ICD-10-CM

## 2024-12-12 DIAGNOSIS — F41.1 GENERALIZED ANXIETY DISORDER: ICD-10-CM

## 2024-12-12 DIAGNOSIS — E78.2 MIXED HYPERLIPIDEMIA: ICD-10-CM

## 2024-12-12 PROCEDURE — 36415 COLL VENOUS BLD VENIPUNCTURE: CPT

## 2024-12-12 PROCEDURE — 93000 ELECTROCARDIOGRAM COMPLETE: CPT

## 2024-12-12 PROCEDURE — G0439: CPT

## 2024-12-12 RX ORDER — ZINC SULFATE 50(220)MG
CAPSULE ORAL
Refills: 0 | Status: ACTIVE | COMMUNITY

## 2024-12-12 RX ORDER — BUSPIRONE HYDROCHLORIDE 5 MG/1
5 TABLET ORAL
Refills: 0 | Status: ACTIVE | COMMUNITY

## 2024-12-12 RX ORDER — CALCIUM CARBONATE 260MG(650)
500 TABLET,CHEWABLE ORAL
Refills: 0 | Status: ACTIVE | COMMUNITY

## 2024-12-12 RX ORDER — ACAMPROSATE CALCIUM 333 MG/1
333 TABLET, DELAYED RELEASE ORAL
Refills: 0 | Status: ACTIVE | COMMUNITY

## 2024-12-12 RX ORDER — ESCITALOPRAM OXALATE 10 MG/1
10 TABLET ORAL
Refills: 0 | Status: ACTIVE | COMMUNITY

## 2024-12-12 RX ORDER — MILK THISTLE 150 MG
CAPSULE ORAL
Refills: 0 | Status: ACTIVE | COMMUNITY

## 2024-12-12 RX ORDER — LORATADINE 10 MG/1
10 CAPSULE, LIQUID FILLED ORAL
Refills: 0 | Status: ACTIVE | COMMUNITY

## 2024-12-13 ENCOUNTER — TRANSCRIPTION ENCOUNTER (OUTPATIENT)
Age: 69
End: 2024-12-13

## 2024-12-13 LAB
25(OH)D3 SERPL-MCNC: 39.1 NG/ML
ALBUMIN SERPL ELPH-MCNC: 4.9 G/DL
ALP BLD-CCNC: 47 U/L
ALT SERPL-CCNC: 20 U/L
ANION GAP SERPL CALC-SCNC: 13 MMOL/L
AST SERPL-CCNC: 11 U/L
BASOPHILS # BLD AUTO: 0.05 K/UL
BASOPHILS NFR BLD AUTO: 0.7 %
BILIRUB SERPL-MCNC: 0.9 MG/DL
BUN SERPL-MCNC: 25 MG/DL
CALCIUM SERPL-MCNC: 10.1 MG/DL
CHLORIDE SERPL-SCNC: 105 MMOL/L
CHOLEST SERPL-MCNC: 178 MG/DL
CO2 SERPL-SCNC: 25 MMOL/L
CREAT SERPL-MCNC: 0.99 MG/DL
EGFR: 62 ML/MIN/1.73M2
EOSINOPHIL # BLD AUTO: 0.31 K/UL
EOSINOPHIL NFR BLD AUTO: 4.5 %
ESTIMATED AVERAGE GLUCOSE: 117 MG/DL
GLUCOSE SERPL-MCNC: 118 MG/DL
HBA1C MFR BLD HPLC: 5.7 %
HCT VFR BLD CALC: 42.9 %
HDLC SERPL-MCNC: 42 MG/DL
HGB BLD-MCNC: 14.1 G/DL
IMM GRANULOCYTES NFR BLD AUTO: 0.3 %
LDLC SERPL CALC-MCNC: 100 MG/DL
LYMPHOCYTES # BLD AUTO: 2.58 K/UL
LYMPHOCYTES NFR BLD AUTO: 37.6 %
MAN DIFF?: NORMAL
MCHC RBC-ENTMCNC: 31.9 PG
MCHC RBC-ENTMCNC: 32.9 G/DL
MCV RBC AUTO: 97.1 FL
MONOCYTES # BLD AUTO: 0.68 K/UL
MONOCYTES NFR BLD AUTO: 9.9 %
NEUTROPHILS # BLD AUTO: 3.22 K/UL
NEUTROPHILS NFR BLD AUTO: 47 %
NONHDLC SERPL-MCNC: 136 MG/DL
PLATELET # BLD AUTO: 234 K/UL
POTASSIUM SERPL-SCNC: 4.6 MMOL/L
PROT SERPL-MCNC: 7.3 G/DL
RBC # BLD: 4.42 M/UL
RBC # FLD: 12.5 %
SODIUM SERPL-SCNC: 143 MMOL/L
TRIGL SERPL-MCNC: 207 MG/DL
TSH SERPL-ACNC: 2.16 UIU/ML
WBC # FLD AUTO: 6.86 K/UL

## 2024-12-18 RX ORDER — TIRZEPATIDE 2.5 MG/.5ML
2.5 INJECTION, SOLUTION SUBCUTANEOUS
Qty: 1 | Refills: 3 | Status: ACTIVE | COMMUNITY
Start: 2024-12-12

## 2024-12-23 ENCOUNTER — TRANSCRIPTION ENCOUNTER (OUTPATIENT)
Age: 69
End: 2024-12-23

## 2024-12-23 ENCOUNTER — NON-APPOINTMENT (OUTPATIENT)
Age: 69
End: 2024-12-23

## 2024-12-26 ENCOUNTER — TRANSCRIPTION ENCOUNTER (OUTPATIENT)
Age: 69
End: 2024-12-26

## 2024-12-26 DIAGNOSIS — R73.03 PREDIABETES.: ICD-10-CM

## 2024-12-27 ENCOUNTER — TRANSCRIPTION ENCOUNTER (OUTPATIENT)
Age: 69
End: 2024-12-27

## 2025-01-02 ENCOUNTER — TRANSCRIPTION ENCOUNTER (OUTPATIENT)
Age: 70
End: 2025-01-02

## 2025-01-02 RX ORDER — SEMAGLUTIDE 0.68 MG/ML
2 INJECTION, SOLUTION SUBCUTANEOUS
Qty: 3 | Refills: 3 | Status: ACTIVE | COMMUNITY
Start: 2024-12-26

## 2025-01-16 ENCOUNTER — APPOINTMENT (OUTPATIENT)
Dept: FAMILY MEDICINE | Facility: CLINIC | Age: 70
End: 2025-01-16
Payer: MEDICARE

## 2025-01-16 VITALS
HEART RATE: 85 BPM | DIASTOLIC BLOOD PRESSURE: 68 MMHG | OXYGEN SATURATION: 97 % | HEIGHT: 62 IN | TEMPERATURE: 97.3 F | WEIGHT: 180 LBS | BODY MASS INDEX: 33.13 KG/M2 | SYSTOLIC BLOOD PRESSURE: 118 MMHG

## 2025-01-16 DIAGNOSIS — J02.9 ACUTE PHARYNGITIS, UNSPECIFIED: ICD-10-CM

## 2025-01-16 PROCEDURE — 99213 OFFICE O/P EST LOW 20 MIN: CPT

## 2025-01-16 RX ORDER — METHYLPREDNISOLONE 4 MG/1
4 TABLET ORAL
Qty: 1 | Refills: 0 | Status: ACTIVE | COMMUNITY
Start: 2025-01-16 | End: 1900-01-01

## 2025-01-20 ENCOUNTER — TRANSCRIPTION ENCOUNTER (OUTPATIENT)
Age: 70
End: 2025-01-20

## 2025-01-20 LAB — BACTERIA THROAT CULT: NORMAL

## 2025-01-30 ENCOUNTER — APPOINTMENT (OUTPATIENT)
Dept: OTOLARYNGOLOGY | Facility: CLINIC | Age: 70
End: 2025-01-30
Payer: MEDICARE

## 2025-01-30 VITALS — BODY MASS INDEX: 33.13 KG/M2 | WEIGHT: 180 LBS | HEIGHT: 62 IN

## 2025-01-30 DIAGNOSIS — R05.9 COUGH, UNSPECIFIED: ICD-10-CM

## 2025-01-30 DIAGNOSIS — J06.9 ACUTE UPPER RESPIRATORY INFECTION, UNSPECIFIED: ICD-10-CM

## 2025-01-30 DIAGNOSIS — R05.3 CHRONIC COUGH: ICD-10-CM

## 2025-01-30 PROCEDURE — 99203 OFFICE O/P NEW LOW 30 MIN: CPT | Mod: 25

## 2025-01-30 PROCEDURE — 31575 DIAGNOSTIC LARYNGOSCOPY: CPT

## 2025-02-20 ENCOUNTER — TRANSCRIPTION ENCOUNTER (OUTPATIENT)
Age: 70
End: 2025-02-20

## 2025-03-25 ENCOUNTER — TRANSCRIPTION ENCOUNTER (OUTPATIENT)
Age: 70
End: 2025-03-25

## 2025-04-21 ENCOUNTER — RX RENEWAL (OUTPATIENT)
Age: 70
End: 2025-04-21

## 2025-04-21 ENCOUNTER — TRANSCRIPTION ENCOUNTER (OUTPATIENT)
Age: 70
End: 2025-04-21

## 2025-04-28 ENCOUNTER — TRANSCRIPTION ENCOUNTER (OUTPATIENT)
Age: 70
End: 2025-04-28

## 2025-04-28 RX ORDER — TIRZEPATIDE 5 MG/.5ML
5 INJECTION, SOLUTION SUBCUTANEOUS
Qty: 1 | Refills: 0 | Status: ACTIVE | COMMUNITY
Start: 2025-04-21 | End: 1900-01-01

## 2025-05-13 ENCOUNTER — APPOINTMENT (OUTPATIENT)
Dept: FAMILY MEDICINE | Facility: CLINIC | Age: 70
End: 2025-05-13

## 2025-05-15 ENCOUNTER — APPOINTMENT (OUTPATIENT)
Dept: FAMILY MEDICINE | Facility: CLINIC | Age: 70
End: 2025-05-15
Payer: MEDICARE

## 2025-05-15 VITALS
WEIGHT: 163 LBS | BODY MASS INDEX: 30 KG/M2 | OXYGEN SATURATION: 98 % | DIASTOLIC BLOOD PRESSURE: 72 MMHG | HEART RATE: 92 BPM | HEIGHT: 62 IN | TEMPERATURE: 97.2 F | SYSTOLIC BLOOD PRESSURE: 132 MMHG

## 2025-05-15 DIAGNOSIS — E78.2 MIXED HYPERLIPIDEMIA: ICD-10-CM

## 2025-05-15 DIAGNOSIS — R73.03 PREDIABETES.: ICD-10-CM

## 2025-05-15 DIAGNOSIS — F10.21 ALCOHOL DEPENDENCE, IN REMISSION: ICD-10-CM

## 2025-05-15 DIAGNOSIS — I10 ESSENTIAL (PRIMARY) HYPERTENSION: ICD-10-CM

## 2025-05-15 DIAGNOSIS — E66.811 OBESITY, CLASS 1: ICD-10-CM

## 2025-05-15 PROCEDURE — 99214 OFFICE O/P EST MOD 30 MIN: CPT

## 2025-05-15 PROCEDURE — G0447 BEHAVIOR COUNSEL OBESITY 15M: CPT | Mod: 59

## 2025-05-15 PROCEDURE — G2211 COMPLEX E/M VISIT ADD ON: CPT

## 2025-05-27 ENCOUNTER — RX RENEWAL (OUTPATIENT)
Age: 70
End: 2025-05-27

## 2025-06-04 ENCOUNTER — APPOINTMENT (OUTPATIENT)
Dept: FAMILY MEDICINE | Facility: CLINIC | Age: 70
End: 2025-06-04
Payer: MEDICARE

## 2025-06-04 VITALS
TEMPERATURE: 97.7 F | BODY MASS INDEX: 30 KG/M2 | SYSTOLIC BLOOD PRESSURE: 150 MMHG | HEIGHT: 62 IN | OXYGEN SATURATION: 98 % | WEIGHT: 163 LBS | DIASTOLIC BLOOD PRESSURE: 90 MMHG | HEART RATE: 90 BPM

## 2025-06-04 VITALS — SYSTOLIC BLOOD PRESSURE: 140 MMHG | DIASTOLIC BLOOD PRESSURE: 74 MMHG

## 2025-06-04 DIAGNOSIS — R39.9 UNSPECIFIED SYMPTOMS AND SIGNS INVOLVING THE GENITOURINARY SYSTEM: ICD-10-CM

## 2025-06-04 LAB
BILIRUB UR QL STRIP: NEGATIVE
CLARITY UR: CLEAR
COLLECTION METHOD: NORMAL
GLUCOSE UR-MCNC: NEGATIVE
HCG UR QL: 0.2 EU/DL
HGB UR QL STRIP.AUTO: NORMAL
KETONES UR-MCNC: NEGATIVE
LEUKOCYTE ESTERASE UR QL STRIP: NORMAL
NITRITE UR QL STRIP: NEGATIVE
PH UR STRIP: 6
PROT UR STRIP-MCNC: NEGATIVE
SP GR UR STRIP: 1.01

## 2025-06-04 PROCEDURE — 81003 URINALYSIS AUTO W/O SCOPE: CPT | Mod: QW

## 2025-06-04 PROCEDURE — G2211 COMPLEX E/M VISIT ADD ON: CPT

## 2025-06-04 PROCEDURE — 99214 OFFICE O/P EST MOD 30 MIN: CPT

## 2025-06-04 RX ORDER — NITROFURANTOIN (MONOHYDRATE/MACROCRYSTALS) 25; 75 MG/1; MG/1
100 CAPSULE ORAL
Qty: 10 | Refills: 0 | Status: ACTIVE | COMMUNITY
Start: 2025-06-04 | End: 1900-01-01

## 2025-06-09 LAB — BACTERIA UR CULT: ABNORMAL

## 2025-06-12 ENCOUNTER — TRANSCRIPTION ENCOUNTER (OUTPATIENT)
Age: 70
End: 2025-06-12

## 2025-06-16 ENCOUNTER — TRANSCRIPTION ENCOUNTER (OUTPATIENT)
Age: 70
End: 2025-06-16

## 2025-06-16 RX ORDER — TIRZEPATIDE 5 MG/.5ML
5 INJECTION, SOLUTION SUBCUTANEOUS
Qty: 1 | Refills: 0 | Status: ACTIVE | COMMUNITY
Start: 2025-06-16 | End: 1900-01-01

## 2025-07-21 ENCOUNTER — TRANSCRIPTION ENCOUNTER (OUTPATIENT)
Age: 70
End: 2025-07-21

## 2025-08-19 ENCOUNTER — APPOINTMENT (OUTPATIENT)
Dept: FAMILY MEDICINE | Facility: CLINIC | Age: 70
End: 2025-08-19
Payer: MEDICARE

## 2025-08-19 VITALS
TEMPERATURE: 98 F | WEIGHT: 151 LBS | OXYGEN SATURATION: 98 % | HEART RATE: 65 BPM | BODY MASS INDEX: 27.79 KG/M2 | DIASTOLIC BLOOD PRESSURE: 70 MMHG | HEIGHT: 62 IN | RESPIRATION RATE: 16 BRPM | SYSTOLIC BLOOD PRESSURE: 102 MMHG

## 2025-08-19 DIAGNOSIS — R39.9 UNSPECIFIED SYMPTOMS AND SIGNS INVOLVING THE GENITOURINARY SYSTEM: ICD-10-CM

## 2025-08-19 LAB
BILIRUB UR QL STRIP: NEGATIVE
GLUCOSE UR-MCNC: NEGATIVE
HCG UR QL: 0.2 EU/DL
HGB UR QL STRIP.AUTO: NORMAL
KETONES UR-MCNC: NEGATIVE
LEUKOCYTE ESTERASE UR QL STRIP: NORMAL
NITRITE UR QL STRIP: NEGATIVE
PH UR STRIP: 6
PROT UR STRIP-MCNC: 100
SP GR UR STRIP: 1.02

## 2025-08-19 PROCEDURE — 99213 OFFICE O/P EST LOW 20 MIN: CPT

## 2025-08-19 PROCEDURE — 81003 URINALYSIS AUTO W/O SCOPE: CPT | Mod: QW

## 2025-08-19 RX ORDER — PHENAZOPYRIDINE 200 MG/1
200 TABLET, FILM COATED ORAL 3 TIMES DAILY
Qty: 6 | Refills: 0 | Status: ACTIVE | COMMUNITY
Start: 2025-08-19 | End: 1900-01-01

## 2025-08-22 LAB — BACTERIA UR CULT: ABNORMAL

## 2025-09-03 ENCOUNTER — RX RENEWAL (OUTPATIENT)
Age: 70
End: 2025-09-03

## 2025-09-15 ENCOUNTER — RX RENEWAL (OUTPATIENT)
Age: 70
End: 2025-09-15